# Patient Record
Sex: FEMALE | Race: WHITE | NOT HISPANIC OR LATINO | Employment: FULL TIME | URBAN - METROPOLITAN AREA
[De-identification: names, ages, dates, MRNs, and addresses within clinical notes are randomized per-mention and may not be internally consistent; named-entity substitution may affect disease eponyms.]

---

## 2021-03-20 ENCOUNTER — IMMUNIZATION (OUTPATIENT)
Dept: PRIMARY CARE CLINIC | Facility: CLINIC | Age: 26
End: 2021-03-20
Payer: COMMERCIAL

## 2021-03-20 DIAGNOSIS — Z23 NEED FOR VACCINATION: Primary | ICD-10-CM

## 2021-03-20 PROCEDURE — 0001A PR IMMUNIZ ADMIN, SARS-COV-2 COVID-19 VACC, 30MCG/0.3ML, 1ST DOSE: CPT | Mod: CV19,S$GLB,, | Performed by: INTERNAL MEDICINE

## 2021-03-20 PROCEDURE — 91300 PR SARS-COV- 2 COVID-19 VACCINE, NO PRSV, 30MCG/0.3ML, IM: CPT | Mod: S$GLB,,, | Performed by: INTERNAL MEDICINE

## 2021-03-20 PROCEDURE — 0001A PR IMMUNIZ ADMIN, SARS-COV-2 COVID-19 VACC, 30MCG/0.3ML, 1ST DOSE: ICD-10-PCS | Mod: CV19,S$GLB,, | Performed by: INTERNAL MEDICINE

## 2021-03-20 PROCEDURE — 91300 PR SARS-COV- 2 COVID-19 VACCINE, NO PRSV, 30MCG/0.3ML, IM: ICD-10-PCS | Mod: S$GLB,,, | Performed by: INTERNAL MEDICINE

## 2021-03-20 RX ADMIN — Medication 0.3 ML: at 09:03

## 2021-04-11 ENCOUNTER — IMMUNIZATION (OUTPATIENT)
Dept: PRIMARY CARE CLINIC | Facility: CLINIC | Age: 26
End: 2021-04-11
Payer: COMMERCIAL

## 2021-04-11 DIAGNOSIS — Z23 NEED FOR VACCINATION: Primary | ICD-10-CM

## 2021-04-11 PROCEDURE — 91300 PR SARS-COV- 2 COVID-19 VACCINE, NO PRSV, 30MCG/0.3ML, IM: ICD-10-PCS | Mod: S$GLB,,, | Performed by: INTERNAL MEDICINE

## 2021-04-11 PROCEDURE — 0002A PR IMMUNIZ ADMIN, SARS-COV-2 COVID-19 VACC, 30MCG/0.3ML, 2ND DOSE: CPT | Mod: CV19,S$GLB,, | Performed by: INTERNAL MEDICINE

## 2021-04-11 PROCEDURE — 0002A PR IMMUNIZ ADMIN, SARS-COV-2 COVID-19 VACC, 30MCG/0.3ML, 2ND DOSE: ICD-10-PCS | Mod: CV19,S$GLB,, | Performed by: INTERNAL MEDICINE

## 2021-04-11 PROCEDURE — 91300 PR SARS-COV- 2 COVID-19 VACCINE, NO PRSV, 30MCG/0.3ML, IM: CPT | Mod: S$GLB,,, | Performed by: INTERNAL MEDICINE

## 2021-04-11 RX ADMIN — Medication 0.3 ML: at 10:04

## 2024-03-07 ENCOUNTER — OFFICE VISIT (OUTPATIENT)
Dept: OBSTETRICS AND GYNECOLOGY | Facility: CLINIC | Age: 29
End: 2024-03-07
Payer: COMMERCIAL

## 2024-03-07 VITALS — WEIGHT: 149.69 LBS | HEIGHT: 63 IN | BODY MASS INDEX: 26.52 KG/M2

## 2024-03-07 DIAGNOSIS — Z01.419 ENCOUNTER FOR GYNECOLOGICAL EXAMINATION: Primary | ICD-10-CM

## 2024-03-07 DIAGNOSIS — Z12.4 SCREENING FOR CERVICAL CANCER: ICD-10-CM

## 2024-03-07 DIAGNOSIS — N92.6 IRREGULAR PERIODS: ICD-10-CM

## 2024-03-07 DIAGNOSIS — Z11.51 SCREENING FOR HPV (HUMAN PAPILLOMAVIRUS): ICD-10-CM

## 2024-03-07 PROCEDURE — 3008F BODY MASS INDEX DOCD: CPT | Mod: CPTII,S$GLB,, | Performed by: OBSTETRICS & GYNECOLOGY

## 2024-03-07 PROCEDURE — 99459 PELVIC EXAMINATION: CPT | Mod: S$GLB,,, | Performed by: OBSTETRICS & GYNECOLOGY

## 2024-03-07 PROCEDURE — 99999 PR PBB SHADOW E&M-EST. PATIENT-LVL III: CPT | Mod: PBBFAC,,, | Performed by: OBSTETRICS & GYNECOLOGY

## 2024-03-07 PROCEDURE — 1159F MED LIST DOCD IN RCRD: CPT | Mod: CPTII,S$GLB,, | Performed by: OBSTETRICS & GYNECOLOGY

## 2024-03-07 PROCEDURE — 88175 CYTOPATH C/V AUTO FLUID REDO: CPT | Performed by: OBSTETRICS & GYNECOLOGY

## 2024-03-07 PROCEDURE — 1160F RVW MEDS BY RX/DR IN RCRD: CPT | Mod: CPTII,S$GLB,, | Performed by: OBSTETRICS & GYNECOLOGY

## 2024-03-07 PROCEDURE — 99385 PREV VISIT NEW AGE 18-39: CPT | Mod: S$GLB,,, | Performed by: OBSTETRICS & GYNECOLOGY

## 2024-03-07 PROCEDURE — 87624 HPV HI-RISK TYP POOLED RSLT: CPT | Performed by: OBSTETRICS & GYNECOLOGY

## 2024-03-07 RX ORDER — INSULIN LISPRO 100 [IU]/ML
INJECTION, SOLUTION INTRAVENOUS; SUBCUTANEOUS
COMMUNITY

## 2024-03-07 RX ORDER — CITALOPRAM 20 MG/1
20 TABLET, FILM COATED ORAL
COMMUNITY

## 2024-03-07 NOTE — PROGRESS NOTES
"Subjective:       Patient ID: Maryjane Land is a 28 y.o. female.    Chief Complaint:  Annual Exam (Last pap: 8/2023 Normal; )      History of Present Illness  - here for annual. Recently  and trying for pregnancy for last couple of months.   - had irregular periods when she was younger. Took ocps on and off in college. Had cramps when periods were irregular.  - reports had a 52 day cycle recently. Periods had otherwise been regular from age 20 until surgery in 2022 when they became somewhat irregular again.  - gets "peak" ovulation monthly since last May, except for in 52 day cycle.   - has had bilateral breast biopsies for diabetic mastopathy.    Past Medical History:   Diagnosis Date    Diabetes mellitus     type 1, dx'd at age 4    H/O breast biopsy        Past Surgical History:   Procedure Laterality Date    MANDIBLE SURGERY Bilateral 08/26/2022    total joint replacements        History reviewed. No pertinent family history.     Social History     Socioeconomic History    Marital status:    Tobacco Use    Smoking status: Never    Smokeless tobacco: Never   Substance and Sexual Activity    Alcohol use: Yes    Drug use: Never    Sexual activity: Yes     Partners: Male           Objective:     Vitals:    03/07/24 0828   Weight: 67.9 kg (149 lb 11.1 oz)   Height: 5' 3" (1.6 m)       Physical Exam:   Constitutional: She is oriented to person, place, and time. She appears well-developed and well-nourished.        Pulmonary/Chest: Right breast exhibits no mass, no nipple discharge, no skin change, no tenderness and no swelling. Left breast exhibits no mass, no nipple discharge, no skin change, no tenderness and no swelling. Breasts are symmetrical.        Abdominal: Soft. She exhibits no distension. There is no abdominal tenderness.     Genitourinary:    Vagina and uterus normal.   There is no tenderness or lesion on the right labia. There is no tenderness or lesion on the left labia. Cervix is " normal. Right adnexum displays no mass, no tenderness and no fullness. Left adnexum displays no mass, no tenderness and no fullness. No vaginal discharge in the vagina.    pap smear completed          Musculoskeletal: Moves all extremeties.       Neurological: She is alert and oriented to person, place, and time.     Psychiatric: She has a normal mood and affect.        A female chaperone was present for exam.    Assessment/ Plan:     Orders Placed This Encounter    HPV High Risk Genotypes, PCR    TSH    Luteinizing Hormone    Follicle Stimulating Hormone    PROLACTIN    ANTIMULLERIAN HORMONE ([facility])    Liquid-Based Pap Smear, Screening       Maryjane was seen today for annual exam.    Diagnoses and all orders for this visit:    Encounter for gynecological examination    Screening for HPV (human papillomavirus)  -     HPV High Risk Genotypes, PCR    Screening for cervical cancer  -     Liquid-Based Pap Smear, Screening    Irregular periods  -     TSH; Future  -     Luteinizing Hormone; Future  -     Follicle Stimulating Hormone; Future  -     PROLACTIN; Future  -     ANTIMULLERIAN HORMONE (AMH); Future    - reassured that cycle is likely regulating.   - draw day 3 labs  - PNV daily    Follow up in about 1 year (around 3/7/2025) for annual exam.    As of April 1, 2021, the Cures Act has been passed nationally. This new law requires that all doctors progress notes, lab results, pathology reports and radiology reports be released IMMEDIATELY to the patient in the patient portal. That means that the results are released to you at the EXACT same time they are released to me. Therefore, with all of the patients that I have I am not able to reply to each patient exactly when the results come in. So there will be a delay from when you see the results to when I see them and have time to come up with a response to send you. Also I only see these results when I am on the computer at work. So if the results come in over the  weekend or after 5 pm of a work day, I will not see them until the next business day. As you can tell, this is a challenge as a physician to give every patient the quick response they hope for and deserve. So please be patient!   Thanks for your understanding and patience.

## 2024-03-14 LAB
FINAL PATHOLOGIC DIAGNOSIS: NORMAL
HPV HR 12 DNA SPEC QL NAA+PROBE: NEGATIVE
HPV16 AG SPEC QL: NEGATIVE
HPV18 DNA SPEC QL NAA+PROBE: NEGATIVE
Lab: NORMAL

## 2024-04-12 LAB
FSH SERPL-ACNC: 6.4 MIU/ML
LH SERPL-ACNC: 9.1 MIU/ML
MIS SERPL-MCNC: 5.58 NG/ML (ref 0.69–13.39)
PROLACTIN SERPL-MCNC: 20 NG/ML

## 2024-04-16 ENCOUNTER — PATIENT MESSAGE (OUTPATIENT)
Dept: OBSTETRICS AND GYNECOLOGY | Facility: CLINIC | Age: 29
End: 2024-04-16
Payer: COMMERCIAL

## 2024-05-14 ENCOUNTER — TELEPHONE (OUTPATIENT)
Dept: OBSTETRICS AND GYNECOLOGY | Facility: CLINIC | Age: 29
End: 2024-05-14
Payer: COMMERCIAL

## 2024-05-14 DIAGNOSIS — N92.6 MISSED MENSES: Primary | ICD-10-CM

## 2024-05-15 ENCOUNTER — TELEPHONE (OUTPATIENT)
Dept: OBSTETRICS AND GYNECOLOGY | Facility: CLINIC | Age: 29
End: 2024-05-15
Payer: COMMERCIAL

## 2024-05-15 ENCOUNTER — LAB VISIT (OUTPATIENT)
Dept: LAB | Facility: HOSPITAL | Age: 29
End: 2024-05-15
Attending: OBSTETRICS & GYNECOLOGY
Payer: COMMERCIAL

## 2024-05-15 DIAGNOSIS — N92.6 MISSED MENSES: ICD-10-CM

## 2024-05-15 DIAGNOSIS — Z34.90 PREGNANCY, UNSPECIFIED GESTATIONAL AGE: Primary | ICD-10-CM

## 2024-05-15 LAB
HCG INTACT+B SERPL-ACNC: 167 MIU/ML
PROGEST SERPL-MCNC: 8.7 NG/ML

## 2024-05-15 PROCEDURE — 84702 CHORIONIC GONADOTROPIN TEST: CPT | Performed by: OBSTETRICS & GYNECOLOGY

## 2024-05-15 PROCEDURE — 36415 COLL VENOUS BLD VENIPUNCTURE: CPT | Performed by: OBSTETRICS & GYNECOLOGY

## 2024-05-15 PROCEDURE — 84144 ASSAY OF PROGESTERONE: CPT | Performed by: OBSTETRICS & GYNECOLOGY

## 2024-05-16 DIAGNOSIS — N92.6 MISSED MENSES: Primary | ICD-10-CM

## 2024-05-16 RX ORDER — PROGESTERONE 200 MG/1
200 CAPSULE ORAL 2 TIMES DAILY
Qty: 30 CAPSULE | Refills: 0 | Status: SHIPPED | OUTPATIENT
Start: 2024-05-16 | End: 2024-05-20 | Stop reason: SDUPTHER

## 2024-05-16 NOTE — TELEPHONE ENCOUNTER
----- Message from Marsha Sampson MD sent at 5/16/2024 10:31 AM CDT -----  Spoke with patient. Start progesterone; I sent Rx in. Needs repeat hCG 48 hours from last; I put order in. Please schedule.  
Gave apt to pt for labs   
<-- Click to add NO pertinent Family History

## 2024-05-17 ENCOUNTER — LAB VISIT (OUTPATIENT)
Dept: LAB | Facility: HOSPITAL | Age: 29
End: 2024-05-17
Attending: OBSTETRICS & GYNECOLOGY
Payer: COMMERCIAL

## 2024-05-17 DIAGNOSIS — N92.6 MISSED MENSES: ICD-10-CM

## 2024-05-17 LAB — HCG INTACT+B SERPL-ACNC: 565 MIU/ML

## 2024-05-17 PROCEDURE — 84702 CHORIONIC GONADOTROPIN TEST: CPT | Performed by: OBSTETRICS & GYNECOLOGY

## 2024-05-17 PROCEDURE — 36415 COLL VENOUS BLD VENIPUNCTURE: CPT | Performed by: OBSTETRICS & GYNECOLOGY

## 2024-05-20 ENCOUNTER — OFFICE VISIT (OUTPATIENT)
Dept: OBSTETRICS AND GYNECOLOGY | Facility: CLINIC | Age: 29
End: 2024-05-20
Payer: COMMERCIAL

## 2024-05-20 VITALS
SYSTOLIC BLOOD PRESSURE: 120 MMHG | WEIGHT: 153 LBS | BODY MASS INDEX: 27.11 KG/M2 | HEIGHT: 63 IN | DIASTOLIC BLOOD PRESSURE: 70 MMHG

## 2024-05-20 DIAGNOSIS — Z34.90 PREGNANCY, UNSPECIFIED GESTATIONAL AGE: ICD-10-CM

## 2024-05-20 DIAGNOSIS — N91.2 AMENORRHEA: Primary | ICD-10-CM

## 2024-05-20 PROCEDURE — 3078F DIAST BP <80 MM HG: CPT | Mod: CPTII,S$GLB,, | Performed by: OBSTETRICS & GYNECOLOGY

## 2024-05-20 PROCEDURE — 3074F SYST BP LT 130 MM HG: CPT | Mod: CPTII,S$GLB,, | Performed by: OBSTETRICS & GYNECOLOGY

## 2024-05-20 PROCEDURE — 1159F MED LIST DOCD IN RCRD: CPT | Mod: CPTII,S$GLB,, | Performed by: OBSTETRICS & GYNECOLOGY

## 2024-05-20 PROCEDURE — 99999 PR PBB SHADOW E&M-EST. PATIENT-LVL III: CPT | Mod: PBBFAC,,, | Performed by: OBSTETRICS & GYNECOLOGY

## 2024-05-20 PROCEDURE — 99213 OFFICE O/P EST LOW 20 MIN: CPT | Mod: S$GLB,,, | Performed by: OBSTETRICS & GYNECOLOGY

## 2024-05-20 PROCEDURE — 3008F BODY MASS INDEX DOCD: CPT | Mod: CPTII,S$GLB,, | Performed by: OBSTETRICS & GYNECOLOGY

## 2024-05-20 RX ORDER — LEVOTHYROXINE SODIUM 25 UG/1
25 TABLET ORAL EVERY MORNING
COMMUNITY
Start: 2024-05-10

## 2024-05-20 RX ORDER — PROGESTERONE 200 MG/1
200 CAPSULE ORAL 2 TIMES DAILY
Qty: 60 CAPSULE | Refills: 2 | Status: SHIPPED | OUTPATIENT
Start: 2024-05-20 | End: 2024-05-29 | Stop reason: SDUPTHER

## 2024-05-20 RX ORDER — INSULIN ASPART 100 [IU]/ML
INJECTION, SOLUTION INTRAVENOUS; SUBCUTANEOUS
COMMUNITY
Start: 2024-04-02

## 2024-05-20 NOTE — PROGRESS NOTES
Subjective:       Patient ID: Maryjane Land is a 28 y.o. female.    Chief Complaint:  Possible Pregnancy (Pregnancy Confirmation:/Start of LMP: 4/7./She does have Type 1 DM. )      History of Present Illness  - patient presents with positive UPT. Taking progesterone BID for low level. bhCG nia appropriately. No issues.  - last hemoglobin A1c was 6.4.     Past Medical History:   Diagnosis Date    Diabetes mellitus     type 1, dx'd at age 4    H/O breast biopsy        Past Surgical History:   Procedure Laterality Date    MANDIBLE SURGERY Bilateral 08/26/2022    total joint replacements        No family history on file.     Social History     Socioeconomic History    Marital status:    Tobacco Use    Smoking status: Never    Smokeless tobacco: Never   Substance and Sexual Activity    Alcohol use: Yes    Drug use: Never    Sexual activity: Yes     Partners: Male     Social Determinants of Health     Financial Resource Strain: Low Risk  (1/31/2023)    Received from [facility]    Overall Financial Resource Strain (CARDIA)     Difficulty of Paying Living Expenses: Not very hard   Food Insecurity: No Food Insecurity (1/31/2023)    Received from [facility]    Hunger Vital Sign     Worried About Running Out of Food in the Last Year: Never true     Ran Out of Food in the Last Year: Never true   Transportation Needs: No Transportation Needs (1/31/2023)    Received from [facility]    PRAPARE - Transportation     Lack of Transportation (Medical): No     Lack of Transportation (Non-Medical): No   Stress: Stress Concern Present (1/31/2023)    Received from [facility]    [employer] - Occupational Stress Questionnaire     Feeling of Stress : To some extent   Housing Stability: Low Risk  (1/31/2023)    Received from [facility]    Housing Stability Vital Sign     Unable to Pay for Housing in the Last Year: No     Number  "of Places Lived in the Last Year: 1     In the last 12 months, was there a time when you did not have a steady place to sleep or slept in a shelter (including now)?: No           Objective:     Vitals:    05/20/24 1409   BP: 120/70   BP Location: Left arm   Patient Position: Sitting   BP Method: Medium (Manual)   Weight: 69.4 kg (153 lb)   Height: 5' 3" (1.6 m)       Physical Exam:   Constitutional: She appears well-developed and well-nourished. She is cooperative. No distress.                           Neurological: She is alert.          Assessment/ Plan:     Orders Placed This Encounter    Urine culture    C. trachomatis/N. gonorrhoeae by [facility]; Urine    POCT urine pregnancy       Maryjane was seen today for possible pregnancy.    Diagnoses and all orders for this visit:    Amenorrhea  -     POCT urine pregnancy    Pregnancy, unspecified gestational age  -     Urine culture  -     C. trachomatis/N. gonorrhoeae by [facility]; Urine    - gave prenatal handout and answered questions.  - continue PNV with DHA daily.  - gave spotting/cramping precautions.  - discussed [id number]; gave pamphlets and answered questions.  - discussed Connected MOM.  - discussed travel and Zika precautions. Check CDC.gov for updates.  - discussed CDC guidelines re: COVID-19. Encouraged patient to be vaccinated per ACOG and CDC recommendations.    - seeing Endo monthly. Will have weekly blood sugar review.     Follow up in 3 weeks (on 6/10/2024) for prenatal visit, ultrasound.    As of April 1, 2021, the Cures Act has been passed nationally. This new law requires that all doctors progress notes, lab results, pathology reports and radiology reports be released IMMEDIATELY to the patient in the patient portal. That means that the results are released to you at the EXACT same time they are released to me. Therefore, with all of the patients that I have I am not able to reply to each patient exactly when the results come in. So " there will be a delay from when you see the results to when I see them and have time to come up with a response to send you. Also I only see these results when I am on the computer at work. So if the results come in over the weekend or after 5 pm of a work day, I will not see them until the next business day. As you can tell, this is a challenge as a physician to give every patient the quick response they hope for and deserve. So please be patient!   Thanks for your understanding and patience.

## 2024-05-22 ENCOUNTER — PATIENT MESSAGE (OUTPATIENT)
Dept: OBSTETRICS AND GYNECOLOGY | Facility: CLINIC | Age: 29
End: 2024-05-22
Payer: COMMERCIAL

## 2024-05-30 RX ORDER — PROGESTERONE 200 MG/1
200 CAPSULE ORAL 2 TIMES DAILY
Qty: 60 CAPSULE | Refills: 2 | Status: SHIPPED | OUTPATIENT
Start: 2024-05-30

## 2024-05-30 NOTE — TELEPHONE ENCOUNTER
Refill Routing Note   Medication(s) are not appropriate for processing by [facility] for the following reason(s):        New or recently adjusted medication  Dose override needed on the order    [facility] action(s):  Defer        Medication Therapy Plan: Previouis order has not been filled yet at pharmacy. Informed patient to check with [facility].      Appointments  past 12m or future 3m with PCP    Date Provider   Last Visit   5/20/2024 Marsha Sampson MD   Next Visit   6/10/2024 Marsha Sampson MD   ED visits in past 90 days: 0        Note composed:7:52 AM 05/30/2024

## 2024-06-10 ENCOUNTER — INITIAL PRENATAL (OUTPATIENT)
Dept: OBSTETRICS AND GYNECOLOGY | Facility: CLINIC | Age: 29
End: 2024-06-10
Payer: COMMERCIAL

## 2024-06-10 ENCOUNTER — PROCEDURE VISIT (OUTPATIENT)
Dept: OBSTETRICS AND GYNECOLOGY | Facility: CLINIC | Age: 29
End: 2024-06-10
Payer: COMMERCIAL

## 2024-06-10 VITALS — DIASTOLIC BLOOD PRESSURE: 72 MMHG | SYSTOLIC BLOOD PRESSURE: 122 MMHG

## 2024-06-10 DIAGNOSIS — Z34.90 PREGNANCY, UNSPECIFIED GESTATIONAL AGE: ICD-10-CM

## 2024-06-10 DIAGNOSIS — O26.841 UTERINE SIZE-DATE DISCREPANCY IN FIRST TRIMESTER: Primary | ICD-10-CM

## 2024-06-10 PROCEDURE — 99999 PR PBB SHADOW E&M-EST. PATIENT-LVL II: CPT | Mod: PBBFAC,,, | Performed by: OBSTETRICS & GYNECOLOGY

## 2024-06-10 PROCEDURE — 0502F SUBSEQUENT PRENATAL CARE: CPT | Mod: CPTII,S$GLB,, | Performed by: OBSTETRICS & GYNECOLOGY

## 2024-06-10 PROCEDURE — 76801 OB US < 14 WKS SINGLE FETUS: CPT | Mod: S$GLB,,, | Performed by: OBSTETRICS & GYNECOLOGY

## 2024-06-10 NOTE — PROGRESS NOTES
Patient presents for initial OB and u/s. No complaints. U/S shows size less than dates; unable to say non-viable due to size of gestational sac. Patient ovulated one week later than LMP would indicate. Will repeat u/s in one week for confirmation. Patient and spouse are aware that u/s likely will not have changed in one week. Can continue progesterone and prenatal vitamin. Will call with any issues.

## 2024-06-17 ENCOUNTER — ROUTINE PRENATAL (OUTPATIENT)
Dept: OBSTETRICS AND GYNECOLOGY | Facility: CLINIC | Age: 29
End: 2024-06-17
Payer: COMMERCIAL

## 2024-06-17 ENCOUNTER — LAB VISIT (OUTPATIENT)
Dept: LAB | Facility: HOSPITAL | Age: 29
End: 2024-06-17
Attending: OBSTETRICS & GYNECOLOGY
Payer: COMMERCIAL

## 2024-06-17 ENCOUNTER — PROCEDURE VISIT (OUTPATIENT)
Dept: OBSTETRICS AND GYNECOLOGY | Facility: CLINIC | Age: 29
End: 2024-06-17
Payer: COMMERCIAL

## 2024-06-17 VITALS — SYSTOLIC BLOOD PRESSURE: 118 MMHG | DIASTOLIC BLOOD PRESSURE: 78 MMHG

## 2024-06-17 DIAGNOSIS — O02.1 MISSED ABORTION: ICD-10-CM

## 2024-06-17 DIAGNOSIS — O26.841 UTERINE SIZE-DATE DISCREPANCY IN FIRST TRIMESTER: ICD-10-CM

## 2024-06-17 DIAGNOSIS — O02.1 MISSED ABORTION: Primary | ICD-10-CM

## 2024-06-17 LAB
BASOPHILS # BLD AUTO: 0.07 K/UL (ref 0–0.2)
BASOPHILS NFR BLD: 0.6 % (ref 0–1.9)
DIFFERENTIAL METHOD BLD: ABNORMAL
EOSINOPHIL # BLD AUTO: 0.1 K/UL (ref 0–0.5)
EOSINOPHIL NFR BLD: 0.7 % (ref 0–8)
ERYTHROCYTE [DISTWIDTH] IN BLOOD BY AUTOMATED COUNT: 12.7 % (ref 11.5–14.5)
HCG INTACT+B SERPL-ACNC: NORMAL MIU/ML
HCT VFR BLD AUTO: 38.1 % (ref 37–48.5)
HGB BLD-MCNC: 12.7 G/DL (ref 12–16)
IMM GRANULOCYTES # BLD AUTO: 0.04 K/UL (ref 0–0.04)
IMM GRANULOCYTES NFR BLD AUTO: 0.4 % (ref 0–0.5)
LYMPHOCYTES # BLD AUTO: 2 K/UL (ref 1–4.8)
LYMPHOCYTES NFR BLD: 17.6 % (ref 18–48)
MCH RBC QN AUTO: 29 PG (ref 27–31)
MCHC RBC AUTO-ENTMCNC: 33.3 G/DL (ref 32–36)
MCV RBC AUTO: 87 FL (ref 82–98)
MONOCYTES # BLD AUTO: 1.2 K/UL (ref 0.3–1)
MONOCYTES NFR BLD: 10.4 % (ref 4–15)
NEUTROPHILS # BLD AUTO: 7.8 K/UL (ref 1.8–7.7)
NEUTROPHILS NFR BLD: 70.3 % (ref 38–73)
NRBC BLD-RTO: 0 /100 WBC
PLATELET # BLD AUTO: 420 K/UL (ref 150–450)
PMV BLD AUTO: 10.8 FL (ref 9.2–12.9)
RBC # BLD AUTO: 4.38 M/UL (ref 4–5.4)
WBC # BLD AUTO: 11.1 K/UL (ref 3.9–12.7)

## 2024-06-17 PROCEDURE — 86850 RBC ANTIBODY SCREEN: CPT | Performed by: OBSTETRICS & GYNECOLOGY

## 2024-06-17 PROCEDURE — 99213 OFFICE O/P EST LOW 20 MIN: CPT | Mod: 57,S$GLB,, | Performed by: OBSTETRICS & GYNECOLOGY

## 2024-06-17 PROCEDURE — 99999 PR PBB SHADOW E&M-EST. PATIENT-LVL III: CPT | Mod: PBBFAC,,, | Performed by: OBSTETRICS & GYNECOLOGY

## 2024-06-17 PROCEDURE — 36415 COLL VENOUS BLD VENIPUNCTURE: CPT | Performed by: OBSTETRICS & GYNECOLOGY

## 2024-06-17 PROCEDURE — 85025 COMPLETE CBC W/AUTO DIFF WBC: CPT | Performed by: OBSTETRICS & GYNECOLOGY

## 2024-06-17 PROCEDURE — 76817 TRANSVAGINAL US OBSTETRIC: CPT | Mod: S$GLB,,, | Performed by: OBSTETRICS & GYNECOLOGY

## 2024-06-17 PROCEDURE — 84702 CHORIONIC GONADOTROPIN TEST: CPT | Performed by: OBSTETRICS & GYNECOLOGY

## 2024-06-17 PROCEDURE — 86900 BLOOD TYPING SEROLOGIC ABO: CPT | Performed by: OBSTETRICS & GYNECOLOGY

## 2024-06-17 NOTE — PROGRESS NOTES
Ultrasound confirms non-viable IUP. Discussed options for management: expectant vs medical vs surgical. Patient opts for suction D&C. Discussed risks/benefits. Consents signed. Labs ordered. Will continue progesterone for now because has requested procedure next week. Case request entered.

## 2024-06-18 ENCOUNTER — TELEPHONE (OUTPATIENT)
Dept: OBSTETRICS AND GYNECOLOGY | Facility: CLINIC | Age: 29
End: 2024-06-18
Payer: COMMERCIAL

## 2024-06-18 LAB
ABO + RH BLD: NORMAL
BLD GP AB SCN CELLS X3 SERPL QL: NORMAL

## 2024-06-18 NOTE — TELEPHONE ENCOUNTER
----- Message from Marsha Sampson MD sent at 6/17/2024  2:28 PM CDT -----    Requested Date: 6/25   Requested Time: 1000   Case Length:30 minutes    Surgeon: Marsha Sampson      Assistant Surgeon: None   Visit Type: Outpatient    LOC[facility]: [facility]    PROCEDURE: suction D&C  Diagnosis: missed ab  Anesthesia type: General   Comments/Special Equipment Needed: suction  Rep needed: No  Does the patient need a PreOp appointment with MD: No  U/S needed at pre-op: No  PCP clearance: Not Needed  When does she need her Post op appointment: 2 weeks virtual   Do you need additional time blocked out from clinic? No  If so, what time do you want to be back in clinic? N/a  When can the patient go back to work? N/a    I signed consents today in clinic.

## 2024-06-19 NOTE — H&P
[facility] ([facility])  Obstetrics & Gynecology  History & Physical    Patient Name: Maryjane Land  MRN: [id number]  Admission Date: (Not on file)  Primary Care Provider: Gaby, Primary Doctor    Subjective:     Chief Complaint/Reason for Admission: missed     History of Present Illness: Patient presented to office for routine pregnancy confirmation. Denies bleeding/cramping. Ultrasounds done a week apart confirm non-viable pregnancy. She desires definitive treatment with suction D&C.    No current facility-administered medications on file prior to encounter.     Current Outpatient Medications on File Prior to Encounter   Medication Sig    citalopram (CELEXA) 20 MG tablet Take 20 mg by mouth.    insulin lispro (HUMALOG U-100 INSULIN) 100 unit/mL injection as directed Injection    NOVOLOG U-100 INSULIN ASPART 100 unit/mL injection Inject into the skin.    prenatal vit/iron fum/folic ac (PRENATAL 1+1 ORAL) Take by mouth once daily.    progesterone (PROMETRIUM) 200 MG capsule Take 1 capsule (200 mg total) by mouth 2 (two) times a day.    SYNTHROID 25 mcg tablet Take 25 mcg by mouth every morning.       Review of patient's allergies indicates:   Allergen Reactions    Gum mastic Blisters, Dermatitis, Hives, Itching, Rash and Swelling    Gum ughksy-texztl-oike-alcohol Hives and Other (See Comments)       Past Medical History:   Diagnosis Date    Diabetes mellitus     type 1, dx'd at age 4    H/O breast biopsy     Hypothyroidism, unspecified      OB History    Para Term  AB Living   1 0 0 0 0 0   SAB IAB Ectopic Multiple Live Births   0 0 0 0 0      # Outcome Date GA Lbr Sarbjit/2nd Weight Sex Type Anes PTL Lv   1 Current              Past Surgical History:   Procedure Laterality Date    MANDIBLE SURGERY Bilateral 2022    total joint replacements     Family History    None       Tobacco Use    Smoking status: Never    Smokeless tobacco: Never   Substance and Sexual Activity     Alcohol use: Yes    Drug use: Never    Sexual activity: Yes     Partners: Male     Review of Systems  Review of Systems - General ROS: negative  Psychological ROS: negative  Respiratory ROS: no cough, shortness of breath, or wheezing  Cardiovascular ROS: no chest pain or dyspnea on exertion  Gastrointestinal ROS: no abdominal pain, change in bowel habits, or black/bloody stools  Genito-Urinary ROS: no dysuria, trouble voiding, or hematuria  Neurological ROS: no TIA or stroke symptoms  Skin ROS: warm, dry, no rashes  Musculoskeletal ROS: no deformities  Allergy ROS: no sneezing or coughing  Breasts ROS: no masses or nipple discharge      Objective:     Vital Signs (Most Recent):    Vital Signs (24h Range):           There is no height or weight on file to calculate BMI.  Patient's last menstrual period was 2024 (exact date).    Physical Exam  Gen: AAO x 3, NAD  VS: see admit vitals  Heart: RRR  Lungs: CTA bilaterally  Abdomen: soft, NT/ND  Pelvic: deferred  Ext: no CCE    Laboratory:  CBC:   Recent Labs   Lab 24  1411   WBC 11.10   RBC 4.38   HGB 12.7   HCT 38.1      MCV 87   MCH 29.0   MCHC 33.3     Type & Screen: A pos, Antibody negative    Diagnostic Results:  US: Reviewed       Assessment/Plan:     Missed     - procedure reviewed in detail with patient and spouse. Consents signed.  - suction D&C on     Marsha Sampson MD  Obstetrics & Gynecology  [facility] ([facility])

## 2024-06-24 NOTE — PRE-PROCEDURE INSTRUCTIONS
Unable to reach patient via phone. Left message with pre procedure instructions and arrival time. The following was sent to pt portal.    Dear Maryjane ,     You are scheduled for a procedure with Dr. Sampson on 6/25/2024. Your scheduled arrival time is 8:00 am.  This arrival time is roughly 2 hours before your anticipated procedure time to allow sufficient time for pre-op.  Please wear comfortable clothing  This procedure will take place at the [facility] at the corner of [facility].  [facility] [place] [facility] to [facility]. The address is:     [address] [place] [address].  [place] [id number]     After entering the building, proceed to the second floor and check in with registration. You should take any medications that you routinely take for blood pressure (other than those listed below), heart medications, thyroid, cholesterol, etc.      If you wear contact lenses, please wear glasses to your procedure.     Your fasting instructions are as follow:     Nothing to eat after midnight the evening before your surgery.   You may drink clear liquids up until 2 hours prior to your arrival time.      You MUST have a responsible adult to bring you home.     The evening before and morning of your procedure, please hold the following medications:  Aspirin and Aspirin-containing products (Goody's powder, Excedrin)  NSAIDs (Advil, Ibuprofen, Aleve, Diclofenac)  Vitamins/Supplements  Herbal remedies/Teas  Stimulants (Adderall, Vyvanse, Adipex)  Diabetic medication (Please bring with you day of procedure)  Prescription creams/gels/lotions        *May take Tylenol        The evening before and morning of your procedure, take a shower using antibacterial soap (ex: Hibiclens or Dial antibacterial soap). DO NOT apply deodorant, lotion, cologne, or anything else to the skin. Do not wear jewelry or bring any valuables with you. If you wear dentures please bring your case with you  or leave them at home. Use and bring any inhalers that you may have.     If you have any procedure-specific questions, please call your surgeon's office. Any other questions, don't hesitate to call at [phone]     Thanks,  Pre-Admit Testing  Anesthesia Dept [facility]

## 2024-06-25 ENCOUNTER — ANESTHESIA EVENT (OUTPATIENT)
Dept: SURGERY | Facility: HOSPITAL | Age: 29
End: 2024-06-25
Payer: COMMERCIAL

## 2024-06-25 ENCOUNTER — ANESTHESIA (OUTPATIENT)
Dept: SURGERY | Facility: HOSPITAL | Age: 29
End: 2024-06-25
Payer: COMMERCIAL

## 2024-06-25 ENCOUNTER — HOSPITAL ENCOUNTER (OUTPATIENT)
Facility: HOSPITAL | Age: 29
Discharge: HOME | End: 2024-06-25
Attending: OBSTETRICS & GYNECOLOGY | Admitting: OBSTETRICS & GYNECOLOGY
Payer: COMMERCIAL

## 2024-06-25 VITALS
TEMPERATURE: 98 F | HEART RATE: 79 BPM | SYSTOLIC BLOOD PRESSURE: 107 MMHG | OXYGEN SATURATION: 100 % | RESPIRATION RATE: 16 BRPM | DIASTOLIC BLOOD PRESSURE: 53 MMHG

## 2024-06-25 DIAGNOSIS — O02.1 MISSED ABORTION: Primary | ICD-10-CM

## 2024-06-25 LAB — POCT GLUCOSE: 118 MG/DL (ref 70–110)

## 2024-06-25 PROCEDURE — 71000015 HC POSTOP RECOV 1ST HR: Performed by: OBSTETRICS & GYNECOLOGY

## 2024-06-25 PROCEDURE — 37000009 HC ANESTHESIA EA ADD 15 MINS: Performed by: OBSTETRICS & GYNECOLOGY

## 2024-06-25 PROCEDURE — 94761 N-INVAS EAR/PLS OXIMETRY MLT: CPT

## 2024-06-25 PROCEDURE — 37000008 HC ANESTHESIA 1ST 15 MINUTES: Performed by: OBSTETRICS & GYNECOLOGY

## 2024-06-25 PROCEDURE — 88305 TISSUE EXAM BY PATHOLOGIST: CPT | Performed by: PATHOLOGY

## 2024-06-25 PROCEDURE — 71000033 HC RECOVERY, INTIAL HOUR: Performed by: OBSTETRICS & GYNECOLOGY

## 2024-06-25 PROCEDURE — 99900035 HC TECH TIME PER 15 MIN (STAT)

## 2024-06-25 PROCEDURE — 36000705 HC OR TIME LEV I EA ADD 15 MIN: Performed by: OBSTETRICS & GYNECOLOGY

## 2024-06-25 PROCEDURE — 25000003 PHARM REV CODE 250: Performed by: OBSTETRICS & GYNECOLOGY

## 2024-06-25 PROCEDURE — 63600175 PHARM REV CODE 636 W HCPCS: Performed by: ANESTHESIOLOGY

## 2024-06-25 PROCEDURE — 88305 TISSUE EXAM BY PATHOLOGIST: CPT | Mod: 26,,, | Performed by: PATHOLOGY

## 2024-06-25 PROCEDURE — 59820 CARE OF MISCARRIAGE: CPT | Mod: ,,, | Performed by: OBSTETRICS & GYNECOLOGY

## 2024-06-25 PROCEDURE — 25000003 PHARM REV CODE 250: Performed by: ANESTHESIOLOGY

## 2024-06-25 PROCEDURE — 25000003 PHARM REV CODE 250: Performed by: NURSE ANESTHETIST, CERTIFIED REGISTERED

## 2024-06-25 PROCEDURE — 36000704 HC OR TIME LEV I 1ST 15 MIN: Performed by: OBSTETRICS & GYNECOLOGY

## 2024-06-25 RX ORDER — PROPOFOL 10 MG/ML
VIAL (ML) INTRAVENOUS
Status: DISCONTINUED | OUTPATIENT
Start: 2024-06-25 | End: 2024-06-25

## 2024-06-25 RX ORDER — IBUPROFEN 200 MG
600 TABLET ORAL EVERY 6 HOURS PRN
Status: DISCONTINUED | OUTPATIENT
Start: 2024-06-25 | End: 2024-06-25 | Stop reason: HOSPADM

## 2024-06-25 RX ORDER — ONDANSETRON HYDROCHLORIDE 2 MG/ML
4 INJECTION, SOLUTION INTRAVENOUS ONCE
Status: COMPLETED | OUTPATIENT
Start: 2024-06-25 | End: 2024-06-25

## 2024-06-25 RX ORDER — ONDANSETRON HYDROCHLORIDE 2 MG/ML
4 INJECTION, SOLUTION INTRAVENOUS DAILY PRN
Status: DISCONTINUED | OUTPATIENT
Start: 2024-06-25 | End: 2024-06-25 | Stop reason: HOSPADM

## 2024-06-25 RX ORDER — DEXAMETHASONE SODIUM PHOSPHATE 4 MG/ML
INJECTION, SOLUTION INTRA-ARTICULAR; INTRALESIONAL; INTRAMUSCULAR; INTRAVENOUS; SOFT TISSUE
Status: DISCONTINUED | OUTPATIENT
Start: 2024-06-25 | End: 2024-06-25

## 2024-06-25 RX ORDER — HYDROMORPHONE HYDROCHLORIDE 1 MG/ML
0.2 INJECTION, SOLUTION INTRAMUSCULAR; INTRAVENOUS; SUBCUTANEOUS EVERY 5 MIN PRN
Status: DISCONTINUED | OUTPATIENT
Start: 2024-06-25 | End: 2024-06-25 | Stop reason: HOSPADM

## 2024-06-25 RX ORDER — LIDOCAINE HYDROCHLORIDE 20 MG/ML
INJECTION INTRAVENOUS
Status: DISCONTINUED | OUTPATIENT
Start: 2024-06-25 | End: 2024-06-25

## 2024-06-25 RX ORDER — HYDROCODONE BITARTRATE AND ACETAMINOPHEN 10; 325 MG/1; MG/1
1 TABLET ORAL EVERY 4 HOURS PRN
Status: DISCONTINUED | OUTPATIENT
Start: 2024-06-25 | End: 2024-06-25 | Stop reason: HOSPADM

## 2024-06-25 RX ORDER — ONDANSETRON 8 MG/1
8 TABLET, ORALLY DISINTEGRATING ORAL EVERY 8 HOURS PRN
Status: DISCONTINUED | OUTPATIENT
Start: 2024-06-25 | End: 2024-06-25

## 2024-06-25 RX ORDER — IBUPROFEN 600 MG/1
600 TABLET ORAL 3 TIMES DAILY
Qty: 30 TABLET | Refills: 0 | Status: SHIPPED | OUTPATIENT
Start: 2024-06-25

## 2024-06-25 RX ORDER — HYDROCODONE BITARTRATE AND ACETAMINOPHEN 5; 325 MG/1; MG/1
1 TABLET ORAL EVERY 4 HOURS PRN
Status: DISCONTINUED | OUTPATIENT
Start: 2024-06-25 | End: 2024-06-25 | Stop reason: HOSPADM

## 2024-06-25 RX ORDER — FENTANYL CITRATE 50 UG/ML
INJECTION, SOLUTION INTRAMUSCULAR; INTRAVENOUS
Status: DISCONTINUED | OUTPATIENT
Start: 2024-06-25 | End: 2024-06-25

## 2024-06-25 RX ORDER — ONDANSETRON HYDROCHLORIDE 2 MG/ML
INJECTION, SOLUTION INTRAVENOUS
Status: DISCONTINUED | OUTPATIENT
Start: 2024-06-25 | End: 2024-06-25

## 2024-06-25 RX ORDER — FENTANYL CITRATE 50 UG/ML
25 INJECTION, SOLUTION INTRAMUSCULAR; INTRAVENOUS EVERY 5 MIN PRN
Status: DISCONTINUED | OUTPATIENT
Start: 2024-06-25 | End: 2024-06-25 | Stop reason: HOSPADM

## 2024-06-25 RX ORDER — DIPHENHYDRAMINE HCL 25 MG
25 CAPSULE ORAL EVERY 4 HOURS PRN
Status: DISCONTINUED | OUTPATIENT
Start: 2024-06-25 | End: 2024-06-25 | Stop reason: HOSPADM

## 2024-06-25 RX ORDER — SILVER NITRATE 38.21; 12.74 MG/1; MG/1
STICK TOPICAL
Status: DISCONTINUED | OUTPATIENT
Start: 2024-06-25 | End: 2024-06-25 | Stop reason: HOSPADM

## 2024-06-25 RX ORDER — ROCURONIUM BROMIDE 10 MG/ML
INJECTION, SOLUTION INTRAVENOUS
Status: DISCONTINUED | OUTPATIENT
Start: 2024-06-25 | End: 2024-06-25

## 2024-06-25 RX ORDER — FAMOTIDINE 20 MG/1
20 TABLET, FILM COATED ORAL
Status: COMPLETED | OUTPATIENT
Start: 2024-06-25 | End: 2024-06-25

## 2024-06-25 RX ORDER — DOXYCYCLINE HYCLATE 100 MG
200 TABLET ORAL
Status: DISCONTINUED | OUTPATIENT
Start: 2024-06-25 | End: 2024-06-25 | Stop reason: HOSPADM

## 2024-06-25 RX ORDER — OXYCODONE AND ACETAMINOPHEN 5; 325 MG/1; MG/1
1 TABLET ORAL
Status: DISCONTINUED | OUTPATIENT
Start: 2024-06-25 | End: 2024-06-25 | Stop reason: HOSPADM

## 2024-06-25 RX ORDER — KETOROLAC TROMETHAMINE 30 MG/ML
INJECTION, SOLUTION INTRAMUSCULAR; INTRAVENOUS
Status: DISCONTINUED | OUTPATIENT
Start: 2024-06-25 | End: 2024-06-25

## 2024-06-25 RX ORDER — MIDAZOLAM HYDROCHLORIDE 1 MG/ML
INJECTION INTRAMUSCULAR; INTRAVENOUS
Status: DISCONTINUED | OUTPATIENT
Start: 2024-06-25 | End: 2024-06-25

## 2024-06-25 RX ORDER — OXYCODONE AND ACETAMINOPHEN 5; 325 MG/1; MG/1
1 TABLET ORAL EVERY 4 HOURS PRN
Qty: 10 TABLET | Refills: 0 | Status: SHIPPED | OUTPATIENT
Start: 2024-06-25

## 2024-06-25 RX ORDER — DIPHENHYDRAMINE HYDROCHLORIDE 50 MG/ML
25 INJECTION INTRAMUSCULAR; INTRAVENOUS EVERY 4 HOURS PRN
Status: DISCONTINUED | OUTPATIENT
Start: 2024-06-25 | End: 2024-06-25 | Stop reason: HOSPADM

## 2024-06-25 RX ORDER — PROCHLORPERAZINE EDISYLATE 5 MG/ML
5 INJECTION INTRAMUSCULAR; INTRAVENOUS EVERY 30 MIN PRN
Status: DISCONTINUED | OUTPATIENT
Start: 2024-06-25 | End: 2024-06-25 | Stop reason: HOSPADM

## 2024-06-25 RX ORDER — SODIUM CHLORIDE 9 MG/ML
INJECTION, SOLUTION INTRAVENOUS CONTINUOUS
Status: DISCONTINUED | OUTPATIENT
Start: 2024-06-25 | End: 2024-06-25 | Stop reason: HOSPADM

## 2024-06-25 RX ADMIN — PROPOFOL 150 MG: 10 INJECTION, EMULSION INTRAVENOUS at 10:06

## 2024-06-25 RX ADMIN — KETOROLAC TROMETHAMINE 30 MG: 30 INJECTION, SOLUTION INTRAMUSCULAR; INTRAVENOUS at 11:06

## 2024-06-25 RX ADMIN — LIDOCAINE HYDROCHLORIDE 100 MG: 20 INJECTION INTRAVENOUS at 10:06

## 2024-06-25 RX ADMIN — MIDAZOLAM HYDROCHLORIDE 2 MG: 1 INJECTION, SOLUTION INTRAMUSCULAR; INTRAVENOUS at 10:06

## 2024-06-25 RX ADMIN — ONDANSETRON 4 MG: 2 INJECTION INTRAMUSCULAR; INTRAVENOUS at 10:06

## 2024-06-25 RX ADMIN — DEXAMETHASONE SODIUM PHOSPHATE 4 MG: 4 INJECTION INTRA-ARTICULAR; INTRALESIONAL; INTRAMUSCULAR; INTRAVENOUS; SOFT TISSUE at 11:06

## 2024-06-25 RX ADMIN — ONDANSETRON 4 MG: 2 INJECTION INTRAMUSCULAR; INTRAVENOUS at 11:06

## 2024-06-25 RX ADMIN — DOXYCYCLINE HYCLATE 200 MG: 100 TABLET, COATED ORAL at 08:06

## 2024-06-25 RX ADMIN — FAMOTIDINE 20 MG: 20 TABLET ORAL at 08:06

## 2024-06-25 RX ADMIN — SODIUM CHLORIDE: 0.9 INJECTION, SOLUTION INTRAVENOUS at 08:06

## 2024-06-25 RX ADMIN — OXYCODONE HYDROCHLORIDE AND ACETAMINOPHEN 1 TABLET: 5; 325 TABLET ORAL at 12:06

## 2024-06-25 RX ADMIN — SODIUM CHLORIDE: 9 INJECTION, SOLUTION INTRAVENOUS at 08:06

## 2024-06-25 RX ADMIN — FENTANYL CITRATE 50 MCG: 50 INJECTION, SOLUTION INTRAMUSCULAR; INTRAVENOUS at 10:06

## 2024-06-25 RX ADMIN — ROCURONIUM BROMIDE 30 MG: 10 INJECTION INTRAVENOUS at 10:06

## 2024-06-25 RX ADMIN — SUGAMMADEX 200 MG: 100 INJECTION, SOLUTION INTRAVENOUS at 11:06

## 2024-06-25 NOTE — ANESTHESIA PROCEDURE NOTES
Intubation    Date/Time: 6/25/2024 10:43 AM    Performed by: Julito Lees MD  Authorized by: Julito Lees MD    Intubation:     Induction:  Intravenous    Intubated:  Postinduction    Mask Ventilation:  Easy mask    Attempts:  1    Attempted By:  CRNA    Method of Intubation:  Video laryngoscopy    Blade:  Wilson 3    Laryngeal View Grade: Grade I - full view of cords      Difficult Airway Encountered?: No      Complications:  None    Airway Device:  Oral endotracheal tube    Airway Device Size:  7.0    Style/Cuff Inflation:  Cuffed    Inflation Amount (mL):  8    Tube secured:  21    Placement Verified By:  Capnometry and Revisualization with laryngoscopy    Complicating Factors:  None    Findings Post-Intubation:  BS equal bilateral

## 2024-06-25 NOTE — BRIEF OP NOTE
[facility] ([facility])  Brief Operative Note    Surgery Date: 2024     Surgeons and Role:     * Marsha Sampson MD - Primary    Assisting Surgeon: None    Pre-op Diagnosis:  Missed  [O02.1]    Post-op Diagnosis:  Post-Op Diagnosis Codes:     * Missed  [O02.1]    Procedure(s) (LRB):  DILATION AND CURETTAGE, UTERUS, USING SUCTION (N/A)    Anesthesia: General    Operative Findings: normal uterus    Estimated Blood Loss: 20 mL         Specimens:   Specimen (24h ago, onward)      Products of conception          Underwent above procedure without difficulty. Tolerated well. Transferred to PACU in stable condition. Uncomplicated postop course. Discharged home in stable condition.      Discharge Note    OUTCOME: Patient tolerated treatment/procedure well without complication and is now ready for discharge.    DISPOSITION: Home or Self Care    FINAL DIAGNOSIS:  Missed     FOLLOWUP: virtual visit in 2 weeks    DISCHARGE INSTRUCTIONS:    Discharge Procedure Orders   Diet general     Call MD for:  temperature >100.4     Call MD for:  persistent nausea and vomiting     Call MD for:  severe uncontrolled pain

## 2024-06-25 NOTE — OP NOTE
[facility] ([facility])  Gynecology  Operative Note    SUMMARY     Date of Procedure: 2024     Procedure: Procedure(s) (LRB):  DILATION AND CURETTAGE, UTERUS, USING SUCTION (N/A)       Surgeons and Role:     * Marsha Sampson MD - Primary    Assisting Surgeon: None    Pre-Operative Diagnosis: Missed  [O02.1]    Post-Operative Diagnosis: Post-Op Diagnosis Codes:     * Missed  [O02.1]    Anesthesia: General    Description of the Findings of the Procedure: products of conception    Complications: No    Estimated Blood Loss (EBL): 20 mL           Specimens:   Specimen (24h ago, onward)       Start     Ordered    24 1055  Specimen to Pathology, Surgery Gynecology and Obstetrics  Once        Comments: Pre-op Diagnosis: Missed  [O02.1]Procedure(s):DILATION AND CURETTAGE, UTERUS, USING SUCTION Number of specimens: 1Name of specimens: 1.Products of Conception -perm     References:    Click here for ordering Quick Tip   Question Answer Comment   Procedure Type: Gynecology and Obstetrics    Specimen Class: Routine/Screening    Release to patient Immediate        24 1114                            Condition: Good    Disposition: PACU - hemodynamically stable.    Attestation: A qualified resident physician was not available.      Procedure in detail:    Patient was taken to the OR and placed in the supine position. General anesthesia was started without difficulty. She was placed in the dorsal lithotomy position. Her vagina was prepped and draped in the usual sterile fashion. Bladder was emptied with straight catheter. A weighted speculum was placed in the vagina. A single tooth tenaculum was used to grasp the anterior lip of the cervix. The cervix was easily and progressively dilated to a #9 Francesca dilator. A 9 mm suction curette was advanced to the uterine fundus and the uterine contents were removed. The suction curette was removed. A sharp curettage was  performed until a gritty texture was noted in all four quadrants. The suction curette was reintroduced to clear the contents of the uterus. The suction curette was removed. The tenaculum was removed. Excellent hemostasis was obtained from the tenaculum sites with application of silver nitrate. The weighted speculum was removed. The patient was awakened and taken to the PACU in stable condition. All counts were correct x 2.

## 2024-06-25 NOTE — PLAN OF CARE
Pt in preop bay 40, VSS, meds given and IV inserted. Pt denies any open wounds on body or the use of any weight loss injections. Pt needs anesthesia consents, otherwise ready to roll. Procedural consents verified with pt.

## 2024-06-25 NOTE — TRANSFER OF CARE
Anesthesia Transfer of Care Note    Patient: Maryjane Land    Procedure(s) Performed: Procedure(s) (LRB):  DILATION AND CURETTAGE, UTERUS, USING SUCTION (N/A)    Patient location: PACU    Anesthesia Type: general    Transport from OR: Transported from OR on 6-10 L/min O2 by face mask with adequate spontaneous ventilation    Post pain: adequate analgesia    Post assessment: no apparent anesthetic complications    Post vital signs: stable    Level of consciousness: awake    Nausea/Vomiting: no nausea/vomiting    Complications: none    Transfer of care protocol was followed    Last vitals: Visit Vitals  /70   Pulse 87   Temp 36.8 °C (98.2 °F) (Temporal)   Resp 18   LMP 04/07/2024 (Exact Date)   SpO2 99%   Breastfeeding No

## 2024-06-25 NOTE — ANESTHESIA PREPROCEDURE EVALUATION
06/25/2024  Maryjane Land is a 28 y.o., female.      Pre-op Assessment    I have reviewed the Patient Summary Reports.     I have reviewed the Nursing Notes. I have reviewed the NPO Status.   I have reviewed the Medications.     Review of Systems  Anesthesia Hx:             Denies Family Hx of Anesthesia complications.    Denies Personal Hx of Anesthesia complications.                      Physical Exam  General: Well nourished    Airway:  Mallampati: II   Mouth Opening: Normal  TM Distance: Normal    Chest/Lungs:  Normal Respiratory Rate    Anesthesia Plan  Type of Anesthesia, risks & benefits discussed:    Anesthesia Type: Gen Natural Airway, Gen Supraglottic Airway  Intra-op Monitoring Plan: Standard ASA Monitors  Post Op Pain Control Plan: multimodal analgesia  Induction:  IV  Informed Consent: Informed consent signed with the Patient and all parties understand the risks and agree with anesthesia plan.  All questions answered.   ASA Score: 1  Day of Surgery Review of History & Physical: H&P Update referred to the surgeon/provider.    Ready For Surgery From Anesthesia Perspective.   .

## 2024-06-25 NOTE — ANESTHESIA POSTPROCEDURE EVALUATION
Anesthesia Post Evaluation    Patient: Maryjane Land    Procedure(s) Performed: Procedure(s) (LRB):  DILATION AND CURETTAGE, UTERUS, USING SUCTION (N/A)    Final Anesthesia Type: general      Patient location during evaluation: PACU  Patient participation: Yes- Able to Participate  Level of consciousness: awake and alert  Post-procedure vital signs: reviewed and stable  Pain management: adequate  Airway patency: patent    PONV status at discharge: No PONV  Anesthetic complications: no      Cardiovascular status: stable  Respiratory status: spontaneous ventilation  Hydration status: euvolemic  Follow-up not needed.          Vitals Value Taken Time   /53 06/25/24 1216   Temp 36.7 °C (98.1 °F) 06/25/24 1123   Pulse 84 06/25/24 1229   Resp 18 06/25/24 1229   SpO2 99 % 06/25/24 1229   Vitals shown include unfiled device data.      Event Time   Out of Recovery 11:55:00         Pain/Marcio Score: Pain Rating Prior to Med Admin: 5 (6/25/2024 12:05 PM)  Marcio Score: 10 (6/25/2024 12:00 PM)

## 2024-06-26 ENCOUNTER — PATIENT MESSAGE (OUTPATIENT)
Dept: OBSTETRICS AND GYNECOLOGY | Facility: CLINIC | Age: 29
End: 2024-06-26
Payer: COMMERCIAL

## 2024-06-27 LAB
FINAL PATHOLOGIC DIAGNOSIS: NORMAL
GROSS: NORMAL
Lab: NORMAL

## 2024-06-29 ENCOUNTER — NURSE TRIAGE (OUTPATIENT)
Dept: ADMINISTRATIVE | Facility: CLINIC | Age: 29
End: 2024-06-29
Payer: COMMERCIAL

## 2024-06-29 NOTE — TELEPHONE ENCOUNTER
D/C , for missed , did not bleed at all yesterday. Felt good.   Did not sleep with a pad last night, woke up, went to bathroom, voided, passed blood. Shower, voided, passed blood, states more like period. No fever, no chills, slight abdominal pain, no bleeding noted on pad. Triage done- dispo Call PCP now    Report to on call, Dr. rGay, report re no bleeding yesterday, woke up, passed blood when voiding, happened again. No fever, no chills, slight abdominal pain. Pad placed to monitor.     Per Dr. Gray, continue to monitor at home, wear pad, call back for 4 pads in 2 hours.     Spoke with patient, advised of above, call OOC back for any questions or concerns, instructed in how to  photo if needed to portal. Verb understanding.     Reason for Disposition   [1] Caller has URGENT question AND [2] triager unable to answer question    Additional Information   Negative: Shock suspected (e.g., cold/pale/clammy skin, too weak to stand, low BP, rapid pulse)   Negative: Difficult to awaken or acting confused (e.g., disoriented, slurred speech)   Negative: Passed out (e.g., fainted, lost consciousness, blacked out and was not responding)   Negative: Sounds like a life-threatening emergency to the triager   Negative: [1] Diagnosed with a threatened miscarriage (threatened ) AND [2] did not have a surgical procedure to remove pregnancy tissue from the uterus (womb)   Negative: [1] Had a medication induced  AND [2] did not have a surgical procedure to remove pregnancy tissue from the uterus (womb)   Negative: [1] Had a miscarriage (spontaneous ) AND [2] did not have a surgical procedure to remove pregnancy tissue from the uterus (womb)   Negative:  took place after 20 weeks of pregnancy   Negative: Breast pain or engorgement is main concern   Negative: Depression is main problem or symptom (e.g., feelings of sadness or hopelessness)   Negative: SEVERE vaginal bleeding  (e.g., soaking 2 pads or tampons per hour and present 2 or more hours; 1 menstrual cup every 2 hours)   Negative: MODERATE vaginal bleeding (e.g., soaking 1 pad or tampon per hour and present > 6 hours; 1 menstrual cup every 6 hours)   Negative: SEVERE dizziness (e.g., unable to stand, requires support to walk, feels like passing out)   Negative: [1] Abdominal pain AND [2] fever 100.4 F (38.0 C) or higher   Negative: SEVERE abdominal pain (e.g., excruciating)   Negative: Pale skin (pallor) of new-onset or worsening   Negative: Severe chills (i.e., feeling extremely cold WITH shaking chills)   Negative: Patient sounds very sick or weak to the triager   Negative: [1] Constant abdominal pain AND [2] present > 2 hours   Negative: Fever 100.4 F (38.0 C) or higher    Protocols used:  - Surgical Procedure Follow-up Call-A-AH

## 2024-07-01 ENCOUNTER — TELEPHONE (OUTPATIENT)
Dept: OBSTETRICS AND GYNECOLOGY | Facility: CLINIC | Age: 29
End: 2024-07-01
Payer: COMMERCIAL

## 2024-07-01 NOTE — TELEPHONE ENCOUNTER
Follow up On Call message.   Pt called on call on   On Call message:  D/C , for missed , did not bleed at all yesterday. Felt good.   Did not sleep with a pad last night, woke up, went to bathroom, voided, passed blood. Shower, voided, passed blood, states more like period. No fever, no chills, slight abdominal pain, no bleeding noted on pad. Triage done- dispo Call PCP now     Report to on call, Dr. Gray, report re no bleeding yesterday, woke up, passed blood when voiding, happened again. No fever, no chills, slight abdominal pain. Pad placed to monitor.      Per Dr. Gray, continue to monitor at home, wear pad, call back for 4 pads in 2 hours.      Spoke with patient, advised of above, call OOC back for any questions or concerns, instructed in how to  photo if needed to portal. Verb understanding.     24 @ [id number]

## 2024-07-08 ENCOUNTER — OFFICE VISIT (OUTPATIENT)
Dept: OBSTETRICS AND GYNECOLOGY | Facility: CLINIC | Age: 29
End: 2024-07-08
Payer: COMMERCIAL

## 2024-07-08 DIAGNOSIS — Z09 POSTOP CHECK: Primary | ICD-10-CM

## 2024-07-08 PROCEDURE — 1159F MED LIST DOCD IN RCRD: CPT | Mod: CPTII,95,, | Performed by: OBSTETRICS & GYNECOLOGY

## 2024-07-08 PROCEDURE — 99024 POSTOP FOLLOW-UP VISIT: CPT | Mod: 95,,, | Performed by: OBSTETRICS & GYNECOLOGY

## 2024-07-08 PROCEDURE — 1160F RVW MEDS BY RX/DR IN RCRD: CPT | Mod: CPTII,95,, | Performed by: OBSTETRICS & GYNECOLOGY

## 2024-07-08 NOTE — PROGRESS NOTES
The patient location is: home  The chief complaint leading to consultation is: postop    Visit type: audiovisual    Face to Face time with patient: 6  10 minutes of total time spent on the encounter, which includes face to face time and non-face to face time preparing to see the patient (eg, review of tests), Obtaining and/or reviewing separately obtained history, Documenting clinical information in the electronic or other health record, Independently interpreting results (not separately reported) and communicating results to the patient/family/caregiver, or Care coordination (not separately reported).         Each patient to whom he or she provides medical services by telemedicine is:  (1) informed of the relationship between the physician and patient and the respective role of any other health care provider with respect to management of the patient; and (2) notified that he or she may decline to receive medical services by telemedicine and may withdraw from such care at any time.    Notes:      Subjective:       Patient ID: Maryjane Land is a 28 y.o. female.    Chief Complaint:  Post-op Evaluation      History of Present Illness  - patient presents for a virtual visit to discuss postop suction D&C.  - occasional pinch type cramp. Bleeding stopped then has had a little the past couple of days.  - overall doing well.    Past Medical History:   Diagnosis Date    Diabetes mellitus     type 1, dx'd at age 4    H/O breast biopsy     Hypothyroidism, unspecified        Past Surgical History:   Procedure Laterality Date    DILATION AND CURETTAGE OF UTERUS USING SUCTION N/A 6/25/2024    Procedure: DILATION AND CURETTAGE, UTERUS, USING SUCTION;  Surgeon: Marsha Sampson MD;  Location: [facility];  Service: OB/GYN;  Laterality: N/A;    MANDIBLE SURGERY Bilateral 08/26/2022    total joint replacements        No family history on file.     Social History     Socioeconomic History    Marital status:     Tobacco Use    Smoking status: Never    Smokeless tobacco: Never   Substance and Sexual Activity    Alcohol use: Yes    Drug use: Never    Sexual activity: Yes     Partners: Male     Social Determinants of Health     Financial Resource Strain: Low Risk  (1/31/2023)    Received from [facility]    Overall Financial Resource Strain (CARDIA)     Difficulty of Paying Living Expenses: Not very hard   Food Insecurity: No Food Insecurity (1/31/2023)    Received from [facility] [employer] [facility] [employer]    Hunger Vital Sign     Worried About Running Out of Food in the Last Year: Never true     Ran Out of Food in the Last Year: Never true   Transportation Needs: No Transportation Needs (1/31/2023)    Received from [facility] [employer] [facility] [employer]    PRAPARE - Transportation     Lack of Transportation (Medical): No     Lack of Transportation (Non-Medical): No   Stress: Stress Concern Present (1/31/2023)    Received from [facility] [employer] [facility]    [employer] [place] [employer] - Occupational Stress Questionnaire     Feeling of Stress : To some extent   Housing Stability: Low Risk  (1/31/2023)    Received from [facility] [employer]    Housing Stability Vital Sign     Unable to Pay for Housing in the Last Year: No     Number of Places Lived in the Last Year: 1     In the last 12 months, was there a time when you did not have a steady place to sleep or slept in a shelter (including now)?: No           Objective:     Gen: AAO x 3, NAD    Virtual visit.    Assessment/ Plan:          Maryjane was seen today for post-op evaluation.    Diagnoses and all orders for this visit:    Postop check    - patient will call or message with any issues.  - cleared for normal activity.  - patient verbalized understanding and agrees with plan.      Follow up for annual exam.    As of April 1, 2021, the Cures Act has been passed nationally. This new law requires that all doctors progress notes, lab results, pathology reports and radiology  reports be released IMMEDIATELY to the patient in the patient portal. That means that the results are released to you at the EXACT same time they are released to me. Therefore, with all of the patients that I have I am not able to reply to each patient exactly when the results come in. So there will be a delay from when you see the results to when I see them and have time to come up with a response to send you. Also I only see these results when I am on the computer at work. So if the results come in over the weekend or after 5 pm of a work day, I will not see them until the next business day. As you can tell, this is a challenge as a physician to give every patient the quick response they hope for and deserve. So please be patient!   Thanks for your understanding and patience.

## 2024-08-26 ENCOUNTER — LAB VISIT (OUTPATIENT)
Dept: LAB | Facility: HOSPITAL | Age: 29
End: 2024-08-26
Attending: OBSTETRICS & GYNECOLOGY
Payer: COMMERCIAL

## 2024-08-26 ENCOUNTER — TELEPHONE (OUTPATIENT)
Dept: OBSTETRICS AND GYNECOLOGY | Facility: CLINIC | Age: 29
End: 2024-08-26
Payer: COMMERCIAL

## 2024-08-26 DIAGNOSIS — Z34.90 PREGNANCY, UNSPECIFIED GESTATIONAL AGE: Primary | ICD-10-CM

## 2024-08-26 DIAGNOSIS — Z34.90 PREGNANCY, UNSPECIFIED GESTATIONAL AGE: ICD-10-CM

## 2024-08-26 LAB
HCG INTACT+B SERPL-ACNC: 88 MIU/ML
PROGEST SERPL-MCNC: 12.4 NG/ML

## 2024-08-26 PROCEDURE — 36415 COLL VENOUS BLD VENIPUNCTURE: CPT | Performed by: OBSTETRICS & GYNECOLOGY

## 2024-08-26 PROCEDURE — 84144 ASSAY OF PROGESTERONE: CPT | Performed by: OBSTETRICS & GYNECOLOGY

## 2024-08-26 PROCEDURE — 84702 CHORIONIC GONADOTROPIN TEST: CPT | Performed by: OBSTETRICS & GYNECOLOGY

## 2024-08-26 NOTE — TELEPHONE ENCOUNTER
Dr Sampson pt calling, just found out she is pregnant had a missed ab over the summer would like to discuss. Pt # [phone]     8/26/24 @ 5049 Returned pt's call. Has confirmation visit scheduled for this Thurs. Pt is concerned due to passed miscarriage and was placed on progesterone with last pregnancy. Orders place for hcg and progesterone today and repeat hcg on Wednesday. Labs scheduled at the [place] location. Pt ariel.

## 2024-08-27 RX ORDER — PROGESTERONE 200 MG/1
200 CAPSULE ORAL NIGHTLY
Qty: 30 CAPSULE | Refills: 0 | Status: SHIPPED | OUTPATIENT
Start: 2024-08-27 | End: 2025-08-27

## 2024-08-28 ENCOUNTER — TELEPHONE (OUTPATIENT)
Dept: OBSTETRICS AND GYNECOLOGY | Facility: CLINIC | Age: 29
End: 2024-08-28
Payer: COMMERCIAL

## 2024-08-28 ENCOUNTER — LAB VISIT (OUTPATIENT)
Dept: LAB | Facility: HOSPITAL | Age: 29
End: 2024-08-28
Attending: OBSTETRICS & GYNECOLOGY
Payer: COMMERCIAL

## 2024-08-28 DIAGNOSIS — Z34.90 PREGNANCY, UNSPECIFIED GESTATIONAL AGE: ICD-10-CM

## 2024-08-28 LAB — HCG INTACT+B SERPL-ACNC: 248 MIU/ML

## 2024-08-28 PROCEDURE — 36415 COLL VENOUS BLD VENIPUNCTURE: CPT | Performed by: OBSTETRICS & GYNECOLOGY

## 2024-08-28 PROCEDURE — 84702 CHORIONIC GONADOTROPIN TEST: CPT | Performed by: OBSTETRICS & GYNECOLOGY

## 2024-08-28 NOTE — TELEPHONE ENCOUNTER
----- Message from Marsha Sampson MD sent at 8/27/2024  9:23 AM CDT -----  See message. Please be sure patient received it.     No

## 2024-08-29 ENCOUNTER — OFFICE VISIT (OUTPATIENT)
Dept: OBSTETRICS AND GYNECOLOGY | Facility: CLINIC | Age: 29
End: 2024-08-29
Payer: COMMERCIAL

## 2024-08-29 ENCOUNTER — PATIENT MESSAGE (OUTPATIENT)
Dept: OBSTETRICS AND GYNECOLOGY | Facility: CLINIC | Age: 29
End: 2024-08-29

## 2024-08-29 VITALS
WEIGHT: 164.69 LBS | BODY MASS INDEX: 29.17 KG/M2 | DIASTOLIC BLOOD PRESSURE: 74 MMHG | SYSTOLIC BLOOD PRESSURE: 128 MMHG

## 2024-08-29 DIAGNOSIS — N91.2 AMENORRHEA: Primary | ICD-10-CM

## 2024-08-29 LAB
B-HCG UR QL: POSITIVE
CTP QC/QA: YES

## 2024-08-29 PROCEDURE — 99999 PR PBB SHADOW E&M-EST. PATIENT-LVL III: CPT | Mod: PBBFAC,,, | Performed by: NURSE PRACTITIONER

## 2024-08-29 PROCEDURE — 87086 URINE CULTURE/COLONY COUNT: CPT | Performed by: NURSE PRACTITIONER

## 2024-08-29 PROCEDURE — 87591 N.GONORRHOEAE DNA AMP PROB: CPT | Performed by: NURSE PRACTITIONER

## 2024-08-29 PROCEDURE — 87491 CHLMYD TRACH DNA AMP PROBE: CPT | Performed by: NURSE PRACTITIONER

## 2024-08-29 NOTE — PROGRESS NOTES
CC: Positive Pregnancy Test    HISTORY OF PRESENT ILLNESS:    Maryjane Land is a 28 y.o. female, ,  Presents today for a routine exam complaining of amenorrhea and positive home urine pregnancy test.  No LMP recorded.   She is not currently on any contraception. Taking PNV and Prometrium 200 mg PO QHS.  Reports breast tenderness. Denies vaginal bleeding and pelvic pain.    Very faint UPT today, recent HCG level: 248 on 24 with initial HC on 24. Progesterone: 12.4 on 24. She suspects later ovulation and likely 4w4d GA today. Regular cycles are 31 day length.     History of missed AB with D&C in .    ROS:  GENERAL: No weight changes. No swelling. No fatigue. No fever.  CARDIOVASCULAR: No chest pain. No shortness of breath. No leg cramps.   NEUROLOGICAL: No headaches. No vision changes.  BREASTS: No pain. No lumps. No discharge.  ABDOMEN: No pain. No diarrhea. No constipation.  REPRODUCTIVE: No abnormal bleeding.   VULVA: No pain. No lesions. No itching.  VAGINA: No relaxation. No itching. No odor. No discharge. No lesions.  URINARY: No incontinence. No nocturia. No frequency. No dysuria.    MEDICATIONS AND ALLERGIES:  Reviewed        COMPREHENSIVE GYN HISTORY:  PAP History: Denies abnormal Paps.  Infection History: Denies STDs. Denies PID.  Benign History: Denies uterine fibroids. Denies ovarian cysts. Denies endometriosis. Denies other conditions.  Cancer History: Denies cervical cancer. Denies uterine cancer or hyperplasia. Denies ovarian cancer. Denies vulvar cancer or pre-cancer. Denies vaginal cancer or pre-cancer. Denies breast cancer. Denies colon cancer.  Sexual Activity History: Reports currently being sexually active  Menstrual History: None.  Contraception: None    /74   Wt 74.7 kg (164 lb 10.9 oz)   Breastfeeding No   BMI 29.17 kg/m²     PE:  AFFECT: Calm, alert and oriented X 3. Interactive during exam  GENERAL: Appears well-nourished, well-developed, in  no acute distress.  HEAD: Normocephalic, atruamatic  SKIN: Normal for race, warm, & dry. No lesions or rashes.  ABDOMEN: Soft and nontender without masses or organomegally.  EXTREMITIES: No cyanosis, clubbing or edema. No calf tenderness.  LYMPH NODES: No axillary or inguinal adenopathy.    PROCEDURES:  UPT Positive  Genprobe    ASSESSMENT/PLAN:  Amenorrhea  Positive urine pregnancy test     -  Routine prenatal care    Nausea and vomiting in pregnancy    -  Education regarding lifestyle and dietary modifications    -  Advised use of B6/Unisom. Pt will notify us if no relief/worsening symptoms, will consider Zofran if needed.      1st TRIMESTER COUNSELING:   Common complaints of pregnancy  HIV and other routine prenatal tests including  genetic screening  Risk factors identified by prenatal history  Oriented to practice - discussed anticipated course of prenatal care & indications for Ultrasound  Childbirth classes/Hospital facilities   Nutrition and weight gain counseling  Toxoplasmosis precautions (Cats/Raw Meat)  Sexual activity and exercise  Environmental/Work hazards  Travel  Tobacco (Ask, Advise, Assess, Assist, and Arrange), as well as alcohol and drug use  Use of any medications (Including supplements, Vitamins, Herbs, or OTC Drugs)  Domestic violence  Seat belt use      TERATOLOGY COUNSELING:   Discussed indications and options for aneuploidy screening - pamphlets given    -  Pt desires MT21, brochure given, she will contact to discuss out of pocket cost     HCG/progesterone level scheduled for Saturday at [facility], recommended against any additional assessment of these levels if in range.   Prometrium increased to 400 mg daily.     Dating US 4 weeks  FOLLOW-UP in 4 weeks Dr. Adrián Islas for OB management.        Wendy Melendez, CHAPIN    OB/GYN

## 2024-08-30 LAB
BACTERIA UR CULT: NORMAL
BACTERIA UR CULT: NORMAL
C TRACH DNA SPEC QL NAA+PROBE: NOT DETECTED
N GONORRHOEA DNA SPEC QL NAA+PROBE: NOT DETECTED

## 2024-08-31 ENCOUNTER — LAB VISIT (OUTPATIENT)
Dept: LAB | Facility: HOSPITAL | Age: 29
End: 2024-08-31
Payer: COMMERCIAL

## 2024-08-31 DIAGNOSIS — N91.2 AMENORRHEA: ICD-10-CM

## 2024-08-31 LAB
HCG INTACT+B SERPL-ACNC: 672 MIU/ML
PROGEST SERPL-MCNC: 18.2 NG/ML

## 2024-08-31 PROCEDURE — 84702 CHORIONIC GONADOTROPIN TEST: CPT | Performed by: NURSE PRACTITIONER

## 2024-08-31 PROCEDURE — 84144 ASSAY OF PROGESTERONE: CPT | Performed by: NURSE PRACTITIONER

## 2024-08-31 PROCEDURE — 36415 COLL VENOUS BLD VENIPUNCTURE: CPT | Performed by: NURSE PRACTITIONER

## 2024-09-08 ENCOUNTER — PATIENT MESSAGE (OUTPATIENT)
Dept: OBSTETRICS AND GYNECOLOGY | Facility: CLINIC | Age: 29
End: 2024-09-08
Payer: COMMERCIAL

## 2024-09-08 DIAGNOSIS — O26.851 SPOTTING AFFECTING PREGNANCY IN FIRST TRIMESTER: Primary | ICD-10-CM

## 2024-09-09 ENCOUNTER — LAB VISIT (OUTPATIENT)
Dept: LAB | Facility: HOSPITAL | Age: 29
End: 2024-09-09
Attending: OBSTETRICS & GYNECOLOGY
Payer: COMMERCIAL

## 2024-09-09 DIAGNOSIS — O26.851 SPOTTING AFFECTING PREGNANCY IN FIRST TRIMESTER: ICD-10-CM

## 2024-09-09 LAB — HCG INTACT+B SERPL-ACNC: NORMAL MIU/ML

## 2024-09-09 PROCEDURE — 84702 CHORIONIC GONADOTROPIN TEST: CPT | Performed by: OBSTETRICS & GYNECOLOGY

## 2024-09-09 PROCEDURE — 36415 COLL VENOUS BLD VENIPUNCTURE: CPT | Performed by: OBSTETRICS & GYNECOLOGY

## 2024-09-09 NOTE — TELEPHONE ENCOUNTER
Ok to repeat bhCG but for reassurance it would need to be repeated 48 hours later to see an appropriate rise.

## 2024-09-10 ENCOUNTER — TELEPHONE (OUTPATIENT)
Dept: OBSTETRICS AND GYNECOLOGY | Facility: CLINIC | Age: 29
End: 2024-09-10
Payer: COMMERCIAL

## 2024-09-10 DIAGNOSIS — O26.851 SPOTTING AFFECTING PREGNANCY IN FIRST TRIMESTER: Primary | ICD-10-CM

## 2024-09-10 NOTE — TELEPHONE ENCOUNTER
----- Message from Marsha Sampson MD sent at 9/10/2024 10:08 AM CDT -----  Patient will be 7 weeks next Monday. Move u/s up a week to coordinate with initial OB with me. We discussed this yesterday. Please call her.    Spoke with patient today. Spotting has completely stopped. No further labs needed.

## 2024-09-10 NOTE — TELEPHONE ENCOUNTER
Spoke with pt, scheduled US prior to visit with Dr. Sampson,  no labs tomorrow.  Cx appt with Wendy.

## 2024-09-17 RX ORDER — CITALOPRAM 20 MG/1
20 TABLET, FILM COATED ORAL DAILY
Qty: 90 TABLET | Refills: 2 | Status: SHIPPED | OUTPATIENT
Start: 2024-09-17

## 2024-09-17 NOTE — TELEPHONE ENCOUNTER
Refill Routing Note   Medication(s) are not appropriate for processing by [facility] for the following reason(s):        No active prescription written by provider    [facility] action(s):  Defer        Medication Therapy Plan: LOV 7/8/24; FOV in 2 days      Appointments  past 12m or future 3m with PCP    Date Provider   Last Visit   7/8/2024 Marsha Sampson MD   Next Visit   9/19/2024 Marsha Sampson MD   ED visits in past 90 days: 0        Note composed:10:13 AM 09/17/2024

## 2024-09-19 ENCOUNTER — PROCEDURE VISIT (OUTPATIENT)
Dept: OBSTETRICS AND GYNECOLOGY | Facility: CLINIC | Age: 29
End: 2024-09-19
Payer: COMMERCIAL

## 2024-09-19 ENCOUNTER — LAB VISIT (OUTPATIENT)
Dept: LAB | Facility: HOSPITAL | Age: 29
End: 2024-09-19
Attending: OBSTETRICS & GYNECOLOGY
Payer: COMMERCIAL

## 2024-09-19 ENCOUNTER — INITIAL PRENATAL (OUTPATIENT)
Dept: OBSTETRICS AND GYNECOLOGY | Facility: CLINIC | Age: 29
End: 2024-09-19
Payer: COMMERCIAL

## 2024-09-19 VITALS
DIASTOLIC BLOOD PRESSURE: 83 MMHG | BODY MASS INDEX: 29.48 KG/M2 | SYSTOLIC BLOOD PRESSURE: 123 MMHG | WEIGHT: 166.44 LBS

## 2024-09-19 DIAGNOSIS — O26.851 SPOTTING AFFECTING PREGNANCY IN FIRST TRIMESTER: ICD-10-CM

## 2024-09-19 DIAGNOSIS — O09.291 PREGNANCY WITH HISTORY OF MISCARRIAGE, FIRST TRIMESTER: Primary | ICD-10-CM

## 2024-09-19 DIAGNOSIS — Z3A.01 7 WEEKS GESTATION OF PREGNANCY: ICD-10-CM

## 2024-09-19 DIAGNOSIS — Z36.89 ENCOUNTER FOR FETAL ANATOMIC SURVEY: ICD-10-CM

## 2024-09-19 LAB
ABO + RH BLD: NORMAL
ALBUMIN SERPL BCP-MCNC: 3.8 G/DL (ref 3.5–5.2)
ALP SERPL-CCNC: 60 U/L (ref 55–135)
ALT SERPL W/O P-5'-P-CCNC: 13 U/L (ref 10–44)
ANION GAP SERPL CALC-SCNC: 10 MMOL/L (ref 8–16)
AST SERPL-CCNC: 19 U/L (ref 10–40)
BASOPHILS # BLD AUTO: 0.07 K/UL (ref 0–0.2)
BASOPHILS NFR BLD: 0.6 % (ref 0–1.9)
BILIRUB SERPL-MCNC: 0.3 MG/DL (ref 0.1–1)
BLD GP AB SCN CELLS X3 SERPL QL: NORMAL
BUN SERPL-MCNC: 11 MG/DL (ref 6–20)
CALCIUM SERPL-MCNC: 9.2 MG/DL (ref 8.7–10.5)
CHLORIDE SERPL-SCNC: 106 MMOL/L (ref 95–110)
CO2 SERPL-SCNC: 18 MMOL/L (ref 23–29)
CREAT SERPL-MCNC: 0.7 MG/DL (ref 0.5–1.4)
DIFFERENTIAL METHOD BLD: ABNORMAL
EOSINOPHIL # BLD AUTO: 0 K/UL (ref 0–0.5)
EOSINOPHIL NFR BLD: 0.2 % (ref 0–8)
ERYTHROCYTE [DISTWIDTH] IN BLOOD BY AUTOMATED COUNT: 12 % (ref 11.5–14.5)
EST. GFR  (NO RACE VARIABLE): >60 ML/MIN/1.73 M^2
GLUCOSE SERPL-MCNC: 75 MG/DL (ref 70–110)
HBV SURFACE AG SERPL QL IA: NORMAL
HCT VFR BLD AUTO: 36.9 % (ref 37–48.5)
HCV AB SERPL QL IA: NORMAL
HGB BLD-MCNC: 12.1 G/DL (ref 12–16)
HIV 1+2 AB+HIV1 P24 AG SERPL QL IA: NORMAL
IMM GRANULOCYTES # BLD AUTO: 0.05 K/UL (ref 0–0.04)
IMM GRANULOCYTES NFR BLD AUTO: 0.4 % (ref 0–0.5)
LYMPHOCYTES # BLD AUTO: 1.8 K/UL (ref 1–4.8)
LYMPHOCYTES NFR BLD: 15 % (ref 18–48)
MCH RBC QN AUTO: 27.8 PG (ref 27–31)
MCHC RBC AUTO-ENTMCNC: 32.8 G/DL (ref 32–36)
MCV RBC AUTO: 85 FL (ref 82–98)
MONOCYTES # BLD AUTO: 1.1 K/UL (ref 0.3–1)
MONOCYTES NFR BLD: 9.2 % (ref 4–15)
NEUTROPHILS # BLD AUTO: 9.1 K/UL (ref 1.8–7.7)
NEUTROPHILS NFR BLD: 74.6 % (ref 38–73)
NRBC BLD-RTO: 0 /100 WBC
PLATELET # BLD AUTO: 386 K/UL (ref 150–450)
PMV BLD AUTO: 11.2 FL (ref 9.2–12.9)
POTASSIUM SERPL-SCNC: 3.6 MMOL/L (ref 3.5–5.1)
PROT SERPL-MCNC: 7.4 G/DL (ref 6–8.4)
RBC # BLD AUTO: 4.35 M/UL (ref 4–5.4)
SODIUM SERPL-SCNC: 134 MMOL/L (ref 136–145)
SPECIMEN OUTDATE: NORMAL
TREPONEMA PALLIDUM IGG+IGM AB [PRESENCE] IN SERUM OR PLASMA BY IMMUNOASSAY: NONREACTIVE
TSH SERPL DL<=0.005 MIU/L-ACNC: 1.03 UIU/ML (ref 0.4–4)
WBC # BLD AUTO: 12.22 K/UL (ref 3.9–12.7)

## 2024-09-19 PROCEDURE — 80053 COMPREHEN METABOLIC PANEL: CPT | Performed by: OBSTETRICS & GYNECOLOGY

## 2024-09-19 PROCEDURE — 86901 BLOOD TYPING SEROLOGIC RH(D): CPT | Performed by: OBSTETRICS & GYNECOLOGY

## 2024-09-19 PROCEDURE — 0502F SUBSEQUENT PRENATAL CARE: CPT | Mod: CPTII,S$GLB,, | Performed by: OBSTETRICS & GYNECOLOGY

## 2024-09-19 PROCEDURE — 87340 HEPATITIS B SURFACE AG IA: CPT | Performed by: OBSTETRICS & GYNECOLOGY

## 2024-09-19 PROCEDURE — 86803 HEPATITIS C AB TEST: CPT | Performed by: OBSTETRICS & GYNECOLOGY

## 2024-09-19 PROCEDURE — 86900 BLOOD TYPING SEROLOGIC ABO: CPT | Performed by: OBSTETRICS & GYNECOLOGY

## 2024-09-19 PROCEDURE — 86762 RUBELLA ANTIBODY: CPT | Performed by: OBSTETRICS & GYNECOLOGY

## 2024-09-19 PROCEDURE — 86593 SYPHILIS TEST NON-TREP QUANT: CPT | Performed by: OBSTETRICS & GYNECOLOGY

## 2024-09-19 PROCEDURE — 87389 HIV-1 AG W/HIV-1&-2 AB AG IA: CPT | Performed by: OBSTETRICS & GYNECOLOGY

## 2024-09-19 PROCEDURE — 76801 OB US < 14 WKS SINGLE FETUS: CPT | Mod: S$GLB,,, | Performed by: OBSTETRICS & GYNECOLOGY

## 2024-09-19 PROCEDURE — 85025 COMPLETE CBC W/AUTO DIFF WBC: CPT | Performed by: OBSTETRICS & GYNECOLOGY

## 2024-09-19 PROCEDURE — 86850 RBC ANTIBODY SCREEN: CPT | Performed by: OBSTETRICS & GYNECOLOGY

## 2024-09-19 PROCEDURE — 36415 COLL VENOUS BLD VENIPUNCTURE: CPT | Performed by: OBSTETRICS & GYNECOLOGY

## 2024-09-19 PROCEDURE — 84443 ASSAY THYROID STIM HORMONE: CPT | Performed by: OBSTETRICS & GYNECOLOGY

## 2024-09-19 PROCEDURE — 99999 PR PBB SHADOW E&M-EST. PATIENT-LVL III: CPT | Mod: PBBFAC,,, | Performed by: OBSTETRICS & GYNECOLOGY

## 2024-09-19 NOTE — PROGRESS NOTES
No complaints. Has continued to have a little bleeding on and off. Blood sugars are well-controlled: HgA1C is 5.9. Reviewed u/s and set dates. Gave spotting/cramping precautions. Continue plan per Endo. Call with any issues.

## 2024-09-20 LAB
RUBV IGG SER-ACNC: 231 IU/ML
RUBV IGG SER-IMP: REACTIVE

## 2024-10-04 ENCOUNTER — PATIENT MESSAGE (OUTPATIENT)
Dept: OBSTETRICS AND GYNECOLOGY | Facility: CLINIC | Age: 29
End: 2024-10-04
Payer: COMMERCIAL

## 2024-10-04 NOTE — TELEPHONE ENCOUNTER
10/4/24 @ 1321 Called pt to discuss further.     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     Pt instructed to avoid heavy lifting, ensure good hydration and take it easy,  monitor if she should start to have heavy / severe cramping or should the spotting increase or should she start to bleed heavily she should go to the ER. Will make sure Dr Sampson is aware. Encouraged pt to call back if she has any further questions and also the on call nurses are available 24/7. Pt ariel.

## 2024-10-07 ENCOUNTER — PROCEDURE VISIT (OUTPATIENT)
Dept: OBSTETRICS AND GYNECOLOGY | Facility: CLINIC | Age: 29
End: 2024-10-07
Payer: COMMERCIAL

## 2024-10-07 DIAGNOSIS — O20.0 THREATENED ABORTION: ICD-10-CM

## 2024-10-07 PROCEDURE — 76815 OB US LIMITED FETUS(S): CPT | Mod: S$GLB,,, | Performed by: OBSTETRICS & GYNECOLOGY

## 2024-10-16 ENCOUNTER — PATIENT MESSAGE (OUTPATIENT)
Dept: OBSTETRICS AND GYNECOLOGY | Facility: CLINIC | Age: 29
End: 2024-10-16
Payer: COMMERCIAL

## 2024-10-17 ENCOUNTER — ROUTINE PRENATAL (OUTPATIENT)
Dept: OBSTETRICS AND GYNECOLOGY | Facility: CLINIC | Age: 29
End: 2024-10-17
Payer: COMMERCIAL

## 2024-10-17 VITALS — WEIGHT: 167.13 LBS | SYSTOLIC BLOOD PRESSURE: 120 MMHG | BODY MASS INDEX: 29.6 KG/M2 | DIASTOLIC BLOOD PRESSURE: 74 MMHG

## 2024-10-17 DIAGNOSIS — Z23 NEED FOR VACCINATION: ICD-10-CM

## 2024-10-17 DIAGNOSIS — Z3A.11 11 WEEKS GESTATION OF PREGNANCY: ICD-10-CM

## 2024-10-17 DIAGNOSIS — O09.291 PREGNANCY WITH HISTORY OF MISCARRIAGE, FIRST TRIMESTER: Primary | ICD-10-CM

## 2024-10-17 DIAGNOSIS — R31.9 HEMATURIA, UNSPECIFIED TYPE: ICD-10-CM

## 2024-10-17 PROCEDURE — 90656 IIV3 VACC NO PRSV 0.5 ML IM: CPT | Mod: S$GLB,,, | Performed by: OBSTETRICS & GYNECOLOGY

## 2024-10-17 PROCEDURE — 87491 CHLMYD TRACH DNA AMP PROBE: CPT | Performed by: OBSTETRICS & GYNECOLOGY

## 2024-10-17 PROCEDURE — 0502F SUBSEQUENT PRENATAL CARE: CPT | Mod: CPTII,S$GLB,, | Performed by: OBSTETRICS & GYNECOLOGY

## 2024-10-17 PROCEDURE — 87591 N.GONORRHOEAE DNA AMP PROB: CPT | Performed by: OBSTETRICS & GYNECOLOGY

## 2024-10-17 PROCEDURE — 90471 IMMUNIZATION ADMIN: CPT | Mod: S$GLB,,, | Performed by: OBSTETRICS & GYNECOLOGY

## 2024-10-17 PROCEDURE — 87086 URINE CULTURE/COLONY COUNT: CPT | Performed by: OBSTETRICS & GYNECOLOGY

## 2024-10-17 PROCEDURE — 99999 PR PBB SHADOW E&M-EST. PATIENT-LVL III: CPT | Mod: PBBFAC,,, | Performed by: OBSTETRICS & GYNECOLOGY

## 2024-10-17 NOTE — PROGRESS NOTES
+2 blood in urine. Will send for cx        Ordering Provider: Marsha Sampson MD    Pt received Flu to R Deltoid.  Pt tolerated injection well.  Pt was instructed to wait 15 minutes to monitor for signs and symptoms of any reactions.  Pt has no further questions, comments, or concerns at this time.

## 2024-10-17 NOTE — PROGRESS NOTES
Has one day per week of bright red blood. Other days with no issues. + FHTs with Vscan and Doppler. Reassured. Flu shot today. Signed Connected MOM order.

## 2024-10-31 ENCOUNTER — PATIENT MESSAGE (OUTPATIENT)
Dept: OBSTETRICS AND GYNECOLOGY | Facility: CLINIC | Age: 29
End: 2024-10-31
Payer: COMMERCIAL

## 2024-10-31 ENCOUNTER — TELEPHONE (OUTPATIENT)
Dept: OBSTETRICS AND GYNECOLOGY | Facility: CLINIC | Age: 29
End: 2024-10-31
Payer: COMMERCIAL

## 2024-11-01 ENCOUNTER — ROUTINE PRENATAL (OUTPATIENT)
Dept: OBSTETRICS AND GYNECOLOGY | Facility: CLINIC | Age: 29
End: 2024-11-01
Payer: COMMERCIAL

## 2024-11-01 ENCOUNTER — PATIENT MESSAGE (OUTPATIENT)
Dept: OBSTETRICS AND GYNECOLOGY | Facility: CLINIC | Age: 29
End: 2024-11-01
Payer: COMMERCIAL

## 2024-11-01 VITALS
DIASTOLIC BLOOD PRESSURE: 70 MMHG | BODY MASS INDEX: 28.02 KG/M2 | WEIGHT: 158.19 LBS | SYSTOLIC BLOOD PRESSURE: 128 MMHG

## 2024-11-01 DIAGNOSIS — O20.9 VAGINAL BLEEDING IN PREGNANCY, FIRST TRIMESTER: Primary | ICD-10-CM

## 2024-11-01 PROCEDURE — 0352U VAGINOSIS SCREEN BY DNA PROBE: CPT | Performed by: OBSTETRICS & GYNECOLOGY

## 2024-11-01 PROCEDURE — 99999 PR PBB SHADOW E&M-EST. PATIENT-LVL III: CPT | Mod: PBBFAC,,, | Performed by: OBSTETRICS & GYNECOLOGY

## 2024-11-01 PROCEDURE — 0502F SUBSEQUENT PRENATAL CARE: CPT | Mod: CPTII,S$GLB,, | Performed by: OBSTETRICS & GYNECOLOGY

## 2024-11-02 LAB
BACTERIAL VAGINOSIS DNA: NOT DETECTED
CANDIDA GLABRATA/KRUSEI: NOT DETECTED
CANDIDA RRNA VAG QL PROBE: NOT DETECTED
TRICHOMONAS VAGINALIS: NOT DETECTED

## 2024-11-05 ENCOUNTER — TELEPHONE (OUTPATIENT)
Dept: OBSTETRICS AND GYNECOLOGY | Facility: CLINIC | Age: 29
End: 2024-11-05
Payer: COMMERCIAL

## 2024-11-05 DIAGNOSIS — Z36.9 ENCOUNTER FOR FETAL ULTRASOUND: Primary | ICD-10-CM

## 2024-11-05 NOTE — TELEPHONE ENCOUNTER
11/5/24 @ 1423 Since last Friday, pt has had an increase in vaginal bleeding. States she is passing clots and blood when on the toilet. She is unsure of how much bleeding is ok, states blood is all bright red and fresh. Pt is concerned, everything was fine on Friday when she saw Dr Ruiz but she does not know how much bleeding is to much. Pt instructed I will speak with Dr Sampson and follow up with her. Pt ariel.

## 2024-11-06 ENCOUNTER — PROCEDURE VISIT (OUTPATIENT)
Dept: MATERNAL FETAL MEDICINE | Facility: CLINIC | Age: 29
End: 2024-11-06
Payer: COMMERCIAL

## 2024-11-06 DIAGNOSIS — Z36.9 ENCOUNTER FOR FETAL ULTRASOUND: ICD-10-CM

## 2024-11-06 PROCEDURE — 76816 OB US FOLLOW-UP PER FETUS: CPT | Mod: S$GLB,,, | Performed by: OBSTETRICS & GYNECOLOGY

## 2024-11-07 ENCOUNTER — PATIENT MESSAGE (OUTPATIENT)
Dept: ADMINISTRATIVE | Facility: OTHER | Age: 29
End: 2024-11-07
Payer: COMMERCIAL

## 2024-11-11 ENCOUNTER — PROCEDURE VISIT (OUTPATIENT)
Dept: MATERNAL FETAL MEDICINE | Facility: CLINIC | Age: 29
End: 2024-11-11
Payer: COMMERCIAL

## 2024-11-11 ENCOUNTER — OFFICE VISIT (OUTPATIENT)
Dept: MATERNAL FETAL MEDICINE | Facility: CLINIC | Age: 29
End: 2024-11-11
Payer: COMMERCIAL

## 2024-11-11 VITALS
BODY MASS INDEX: 29.09 KG/M2 | DIASTOLIC BLOOD PRESSURE: 79 MMHG | WEIGHT: 164.25 LBS | SYSTOLIC BLOOD PRESSURE: 128 MMHG

## 2024-11-11 DIAGNOSIS — O20.9 VAGINAL BLEEDING IN PREGNANCY, FIRST TRIMESTER: ICD-10-CM

## 2024-11-11 DIAGNOSIS — E03.9 HYPOTHYROIDISM AFFECTING PREGNANCY IN SECOND TRIMESTER: ICD-10-CM

## 2024-11-11 DIAGNOSIS — O46.8X2 SUBCHORIONIC HEMATOMA IN SECOND TRIMESTER, SINGLE OR UNSPECIFIED FETUS: ICD-10-CM

## 2024-11-11 DIAGNOSIS — O24.319 MATERNAL PREGESTATIONAL DIABETES CLASSES B THROUGH R, ANTEPARTUM: Primary | ICD-10-CM

## 2024-11-11 DIAGNOSIS — O41.8X20 SUBCHORIONIC HEMATOMA IN SECOND TRIMESTER, SINGLE OR UNSPECIFIED FETUS: ICD-10-CM

## 2024-11-11 DIAGNOSIS — O99.282 HYPOTHYROIDISM AFFECTING PREGNANCY IN SECOND TRIMESTER: ICD-10-CM

## 2024-11-11 DIAGNOSIS — Z36.2 ENCOUNTER FOR FOLLOW-UP ULTRASOUND OF FETAL ANATOMY: Primary | ICD-10-CM

## 2024-11-11 PROBLEM — O02.1 MISSED ABORTION: Status: RESOLVED | Noted: 2024-06-25 | Resolved: 2024-11-11

## 2024-11-11 PROCEDURE — 1159F MED LIST DOCD IN RCRD: CPT | Mod: CPTII,S$GLB,, | Performed by: OBSTETRICS & GYNECOLOGY

## 2024-11-11 PROCEDURE — 76815 OB US LIMITED FETUS(S): CPT | Mod: S$GLB,,, | Performed by: OBSTETRICS & GYNECOLOGY

## 2024-11-11 PROCEDURE — 99215 OFFICE O/P EST HI 40 MIN: CPT | Mod: S$GLB,,, | Performed by: OBSTETRICS & GYNECOLOGY

## 2024-11-11 PROCEDURE — 3074F SYST BP LT 130 MM HG: CPT | Mod: CPTII,S$GLB,, | Performed by: OBSTETRICS & GYNECOLOGY

## 2024-11-11 PROCEDURE — 3078F DIAST BP <80 MM HG: CPT | Mod: CPTII,S$GLB,, | Performed by: OBSTETRICS & GYNECOLOGY

## 2024-11-11 PROCEDURE — 3008F BODY MASS INDEX DOCD: CPT | Mod: CPTII,S$GLB,, | Performed by: OBSTETRICS & GYNECOLOGY

## 2024-11-11 PROCEDURE — 99999 PR PBB SHADOW E&M-EST. PATIENT-LVL III: CPT | Mod: PBBFAC,,, | Performed by: OBSTETRICS & GYNECOLOGY

## 2024-11-11 NOTE — ASSESSMENT & PLAN NOTE
She was diagnosed with hypothyroidism is 4/24. Managed by Dr. Cid (endocrine).   Lab Results   Component Value Date    TSH 1.031 09/19/2024     On Synthroid 25 mcg 5d/wk/50 mcg 2 d/wk. No sx.     Thyroid requirements increase in pregnancy due to increases in metabolism and thyroid binding globulin. Thyroid function tests should be followed closely to ensure adequate treatment.  Hypothyroidism has been associated with higher pregnancy complication rates including miscarriage, preeclampsia, abruption, low birth weight and stillbirth but when well controlled, the risks are not increased.  Untreated hypothyroidism has also been associated with adverse fetal neurological development, but well treated hypothyroidism (i.e., euthyroid state) carries an excellent prognosis for mother and baby.      Recommendations:  Medication management:  Continue Synthroid at the current dose   Up to 1/3 of women may require increased hormone replacement in the first trimester,     Laboratory evaluation:   TSH should be monitored at least every trimester, or every 4 weeks after any medication adjustment. Adjust treatment as necessary to maintain TSH goal < 2.5 mIU/L.

## 2024-11-11 NOTE — ASSESSMENT & PLAN NOTE
She has been having vaginal bleeding for the last several weeks of pregnancy.  It started out with bright red bleeding and passage of clots.  She was on progesterone as well.  She stopped the progesterone about a week ago.  She has been noted to have a subchorionic hematoma.  Now she is mainly having old blood with smaller amounts of bleeding noted.  She has not had any significant cramping with the bleeding recently.    On ultrasound today a 6.8 x 6.7 x 2.8 cm subchorionic hematoma is seen at the edge of the placenta.  There is no retroplacental clot.  The amniotic fluid volume is normal.    I counseled her regarding today's ultrasound findings.  We discussed that subchorionic hematoma can be associated with an increased risk for pregnancy complications including  delivery.  I explained that there is no particular intervention at this point except avoiding any strenuous activities.  We discussed how subchorionic hematomas develop.    I explained that we would plan to re-evaluate the clot at the time of her anatomy scan but in the interim I would not recommend any particular intervention.  Because of the bleeding I told her I would hold off at this time on low-dose aspirin.

## 2024-11-11 NOTE — PROGRESS NOTES
13w2d  Patient presents with complaint of recurrent episodes of vaginal bleeding. She reports had increase of dark blood with small clot pass in overnight/this AM. No cramping that she can feel. No abnormal discharge otherwise.   Dating ultrasound reviewed and follow up ultrasound for bleeding done recently normal.  SSE: No vulvar or vaginal discharge or lesions noted. Dark bloody mucous discharge present on walls of vagina only. No active bleeding noted. Os closed. Vaginosis culture obtained for work up and urine culture.  Reviewed findings with patient. Recommend MFM referral. Good fetal heart tones today.  ED precautions reviewed.  RTC as scheduled with primary OB

## 2024-11-11 NOTE — ASSESSMENT & PLAN NOTE
She has been diabetic for >15 years. She is followed by Dr. Cid (endocrine). She has a Dexcom and a tandem pump. She reports that her last A1c was 5.6%. She is seen by endocrine weekly and reports excellent glycemic control.   The patient was counseled regarding the risks of diabetes in pregnancy. These risks include but are not limited to an increased risk of , preeclampsia/gestational hypertension, fetal structural anomalies, macrosomia, prematurity, and  complications, especially in those patients with poor glucose control. I also discussed with the patient the need for increased fetal surveillance with serial fetal growth assessments and third trimester fetal testing. I also reviewed the possibility of need for early delivery in those with poor glucose control or evidence of fetal growth abnormalities.    Recommendations (Please refer to [facility] guidelines):  Baseline evaluation (to be ordered by primary OB provider):  24 hour urine protein or urine P/C ratio, CBC, CMP  Maternal EKG if not done in the last 2 years  She reports regular eye exams  Hemoglobin A1C every trimester  Fasting and 2-hour postprandial glucose monitoring.   Goals of fasting levels below 95 mg/dL and postprandial levels below 120 mg/dL.   Continue follow-up with endocrine  Medications:   Insulin pump with Continuous Glucose Meter (CGM)  The patient has an insulin pump and CGM. She follows with Endocrinology (Primary MD/NP Dr. Cid)  Ultrasounds:  Targeted anatomy survey at 20 weeks--scheduled  Serial growth ultrasounds q 4-6 weeks at 26-28 weeks  A fetal echocardiogram is recommended at 22-24 weeks with pediatric cardiology  Prenatal testing  Twice weekly BPP/ NST + AFV starting at 32 weeks. (Should be ordered and arranged by the patient's primary OB provider).  Delivery timin 0/7 to 38 and 6/7 weeks if under good control without comorbidities  37 0/7 to 37 and 6/7 weeks if longstanding diabetes or poorly  controlled, polyhydramnios, EFW>90th percentile, or BMI >= 40  36 0/7 to 37 and 6/7 weeks if vascular complications, prior stillbirth or other complicating conditions.  Recommend consideration of earlier delivery if IUGR, HTN, or other complications  Recommend offering  for delivery is EFW is 4500g or more near the time of delivery  Insulin management during labor and delivery  Type 1 diabetics require an infusion of glucose (D5 ½ NS) and insulin to avoid ketosis and hypoglycemia; the insulin infusion can be temporarily paused if hypoglycemia is noted and additional dextrose (D10 or ½ amp of D50) can be administered  Patients who are well controlled with an insulin pump can continue during labor and delivery. Recommend primary OB ensure pump site is out of operative field and confirm pump is compatible with and able to be left on during surgery.  Postpartum management  Insulin should be reduced by 1/2 of the pre-delivery dose within the first 6-24 hours following delivery.  Patients who are breastfeeding should be advised to have small snacks before breastfeeding to reduce the risk of hypoglycemia.  Patients should be referred to primary MD or Endocrinology for postpartum management.    She was also advised that if she notes nausea/vomiting, inability to tolerate PO, or concerns about being able to take her insulin that she should contact her provider or present to the OB ED.

## 2024-11-11 NOTE — PROGRESS NOTES
MATERNAL-FETAL MEDICINE   CONSULT NOTE    Provider requesting consultation: Dr. Sampson    SUBJECTIVE:     Ms. Maryjane Land is a 28 y.o.  female with IUP at 14w5d who is seen in consultation by MFM for evaluation and management of:  Problem   Maternal Pregestational Diabetes Classes B Through R, Antepartum   Subchorionic Hematoma in Second Trimester   Hypothyroidism Affecting Pregnancy in Second Trimester   Missed  (Resolved)            Medication List with Changes/Refills   Current Medications    CITALOPRAM (CELEXA) 20 MG TABLET    Take 1 tablet (20 mg total) by mouth once daily.    NOVOLOG U-100 INSULIN ASPART 100 UNIT/ML INJECTION    Inject into the skin.    PRENATAL VIT/IRON FUM/FOLIC AC (PRENATAL 1+1 ORAL)    Take by mouth once daily.    SYNTHROID 25 MCG TABLET    Take 25 mcg by mouth every morning. Takes 50mcg twice a week   Discontinued Medications    PROGESTERONE (PROMETRIUM) 200 MG CAPSULE    Take 1 capsule (200 mg total) by mouth nightly.       Review of patient's allergies indicates:   Allergen Reactions    Gum mastic Blisters, Dermatitis, Hives, Itching, Rash and Swelling    Gum zswiju-pevils-ugan-alcohol Hives and Other (See Comments)       PMH:  Past Medical History:   Diagnosis Date    Diabetes mellitus     type 1, dx'd at age 4    H/O breast biopsy     Hypothyroidism, unspecified        PObHx:  OB History    Para Term  AB Living   2 0 0 0 1 0   SAB IAB Ectopic Multiple Live Births   1 0 0 0 0      # Outcome Date GA Lbr Sarbjit/2nd Weight Sex Type Anes PTL Lv   2 Current            1 SAB 2024 6w0d    SAB          PSH:  Past Surgical History:   Procedure Laterality Date    DILATION AND CURETTAGE OF UTERUS USING SUCTION N/A 2024    Procedure: DILATION AND CURETTAGE, UTERUS, USING SUCTION;  Surgeon: Marsha Sampson MD;  Location: [facility];  Service: OB/GYN;  Laterality: N/A;    MANDIBLE SURGERY Bilateral 2022    total joint replacements       Family  history:family history is not on file.    Social history: reports that she has never smoked. She has never used smokeless tobacco. She reports that she does not currently use alcohol. She reports that she does not use drugs.    Genetic history:  The patient denies any inherited genetic diseases or birth defects in herself or her partner's personal history or family.    Objective:   /79 (BP Location: Left arm, Patient Position: Sitting)   Wt 74.5 kg (164 lb 3.9 oz)   LMP 2024 (Exact Date)   BMI 29.09 kg/m²         Ultrasound performed. See viewpoint for full ultrasound report.          ASSESSMENT/PLAN:     28 y.o.  female with IUP at 14w5d     Hypothyroidism affecting pregnancy in second trimester  She was diagnosed with hypothyroidism is . Managed by Dr. Cid (endocrine).   Lab Results   Component Value Date    TSH 1.031 2024     On Synthroid 25 mcg 5d/wk/50 mcg 2 d/wk. No sx.     Thyroid requirements increase in pregnancy due to increases in metabolism and thyroid binding globulin. Thyroid function tests should be followed closely to ensure adequate treatment.  Hypothyroidism has been associated with higher pregnancy complication rates including miscarriage, preeclampsia, abruption, low birth weight and stillbirth but when well controlled, the risks are not increased.  Untreated hypothyroidism has also been associated with adverse fetal neurological development, but well treated hypothyroidism (i.e., euthyroid state) carries an excellent prognosis for mother and baby.      Recommendations:  Medication management:  Continue Synthroid at the current dose   Up to 1/3 of women may require increased hormone replacement in the first trimester,     Laboratory evaluation:   TSH should be monitored at least every trimester, or every 4 weeks after any medication adjustment. Adjust treatment as necessary to maintain TSH goal < 2.5 mIU/L.      Maternal pregestational diabetes classes B  through R, antepartum  She has been diabetic for >15 years. She is followed by Dr. Cid (endocrine). She has a Dexcom and a tandem pump. She reports that her last A1c was 5.6%. She is seen by endocrine weekly and reports excellent glycemic control.   The patient was counseled regarding the risks of diabetes in pregnancy. These risks include but are not limited to an increased risk of , preeclampsia/gestational hypertension, fetal structural anomalies, macrosomia, prematurity, and  complications, especially in those patients with poor glucose control. I also discussed with the patient the need for increased fetal surveillance with serial fetal growth assessments and third trimester fetal testing. I also reviewed the possibility of need for early delivery in those with poor glucose control or evidence of fetal growth abnormalities.    Recommendations (Please refer to [facility] guidelines):  Baseline evaluation (to be ordered by primary OB provider):  24 hour urine protein or urine P/C ratio, CBC, CMP  Maternal EKG if not done in the last 2 years  She reports regular eye exams  Hemoglobin A1C every trimester  Fasting and 2-hour postprandial glucose monitoring.   Goals of fasting levels below 95 mg/dL and postprandial levels below 120 mg/dL.   Continue follow-up with endocrine  Medications:   Insulin pump with Continuous Glucose Meter (CGM)  The patient has an insulin pump and CGM. She follows with Endocrinology (Primary MD/NP Dr. Cid)  Ultrasounds:  Targeted anatomy survey at 20 weeks--scheduled  Serial growth ultrasounds q 4-6 weeks at 26-28 weeks  A fetal echocardiogram is recommended at 22-24 weeks with pediatric cardiology  Prenatal testing  Twice weekly BPP/ NST + AFV starting at 32 weeks. (Should be ordered and arranged by the patient's primary OB provider).  Delivery timin 0/7 to 38 and 6/7 weeks if under good control without comorbidities  37 0/7 to 37 and 6/7 weeks if  longstanding diabetes or poorly controlled, polyhydramnios, EFW>90th percentile, or BMI >= 40  36 0/7 to 37 and 6/7 weeks if vascular complications, prior stillbirth or other complicating conditions.  Recommend consideration of earlier delivery if IUGR, HTN, or other complications  Recommend offering  for delivery is EFW is 4500g or more near the time of delivery  Insulin management during labor and delivery  Type 1 diabetics require an infusion of glucose (D5 ½ NS) and insulin to avoid ketosis and hypoglycemia; the insulin infusion can be temporarily paused if hypoglycemia is noted and additional dextrose (D10 or ½ amp of D50) can be administered  Patients who are well controlled with an insulin pump can continue during labor and delivery. Recommend primary OB ensure pump site is out of operative field and confirm pump is compatible with and able to be left on during surgery.  Postpartum management  Insulin should be reduced by 1/2 of the pre-delivery dose within the first 6-24 hours following delivery.  Patients who are breastfeeding should be advised to have small snacks before breastfeeding to reduce the risk of hypoglycemia.  Patients should be referred to primary MD or Endocrinology for postpartum management.    She was also advised that if she notes nausea/vomiting, inability to tolerate PO, or concerns about being able to take her insulin that she should contact her provider or present to the OB ED.      Subchorionic hematoma in second trimester  She has been having vaginal bleeding for the last several weeks of pregnancy.  It started out with bright red bleeding and passage of clots.  She was on progesterone as well.  She stopped the progesterone about a week ago.  She has been noted to have a subchorionic hematoma.  Now she is mainly having old blood with smaller amounts of bleeding noted.  She has not had any significant cramping with the bleeding recently.    On ultrasound today a 6.8 x 6.7 x 2.8  cm subchorionic hematoma is seen at the edge of the placenta.  There is no retroplacental clot.  The amniotic fluid volume is normal.    I counseled her regarding today's ultrasound findings.  We discussed that subchorionic hematoma can be associated with an increased risk for pregnancy complications including  delivery.  I explained that there is no particular intervention at this point except avoiding any strenuous activities.  We discussed how subchorionic hematomas develop.    I explained that we would plan to re-evaluate the clot at the time of her anatomy scan but in the interim I would not recommend any particular intervention.  Because of the bleeding I told her I would hold off at this time on low-dose aspirin.      FOLLOW UP:     F/u in 4 weeks for US/MFM visit      50 minutes of total time spent on the encounter, which includes face to face time and non-face to face time preparing to see the patient (eg, review of tests), obtaining and/or reviewing separately obtained history, documenting clinical information in the electronic or other health record, independently interpreting results (not separately reported) and communicating results to the patient/family/caregiver, or care coordination (not separately reported).      Luis Fernando Paz  Maternal-Fetal Medicine    Electronically Signed by Luis Fernando Paz 2024

## 2024-11-14 ENCOUNTER — ROUTINE PRENATAL (OUTPATIENT)
Dept: OBSTETRICS AND GYNECOLOGY | Facility: CLINIC | Age: 29
End: 2024-11-14
Payer: COMMERCIAL

## 2024-11-14 ENCOUNTER — LAB VISIT (OUTPATIENT)
Dept: LAB | Facility: HOSPITAL | Age: 29
End: 2024-11-14
Attending: INTERNAL MEDICINE
Payer: COMMERCIAL

## 2024-11-14 VITALS
BODY MASS INDEX: 29.45 KG/M2 | DIASTOLIC BLOOD PRESSURE: 70 MMHG | SYSTOLIC BLOOD PRESSURE: 110 MMHG | WEIGHT: 166.25 LBS

## 2024-11-14 DIAGNOSIS — I51.9 MYXEDEMA HEART DISEASE: ICD-10-CM

## 2024-11-14 DIAGNOSIS — R53.83 FATIGUE: ICD-10-CM

## 2024-11-14 DIAGNOSIS — E10.9 DIABETES MELLITUS TYPE I: ICD-10-CM

## 2024-11-14 DIAGNOSIS — O24.012 TYPE 1 DIABETES MELLITUS AFFECTING PREGNANCY IN SECOND TRIMESTER, ANTEPARTUM: Primary | ICD-10-CM

## 2024-11-14 DIAGNOSIS — E03.9 MYXEDEMA HEART DISEASE: ICD-10-CM

## 2024-11-14 DIAGNOSIS — E03.9 HYPOTHYROIDISM AFFECTING PREGNANCY IN SECOND TRIMESTER: ICD-10-CM

## 2024-11-14 DIAGNOSIS — Z79.899 NEED FOR PROPHYLACTIC CHEMOTHERAPY: ICD-10-CM

## 2024-11-14 DIAGNOSIS — O24.319 MATERNAL PREGESTATIONAL DIABETES CLASSES B THROUGH R, ANTEPARTUM: ICD-10-CM

## 2024-11-14 DIAGNOSIS — E55.9 AVITAMINOSIS D: ICD-10-CM

## 2024-11-14 DIAGNOSIS — O99.282 HYPOTHYROIDISM AFFECTING PREGNANCY IN SECOND TRIMESTER: ICD-10-CM

## 2024-11-14 DIAGNOSIS — Z3A.15 15 WEEKS GESTATION OF PREGNANCY: ICD-10-CM

## 2024-11-14 PROCEDURE — 84481 FREE ASSAY (FT-3): CPT | Performed by: INTERNAL MEDICINE

## 2024-11-14 PROCEDURE — 84443 ASSAY THYROID STIM HORMONE: CPT | Performed by: INTERNAL MEDICINE

## 2024-11-14 PROCEDURE — 83036 HEMOGLOBIN GLYCOSYLATED A1C: CPT | Performed by: INTERNAL MEDICINE

## 2024-11-14 PROCEDURE — 0502F SUBSEQUENT PRENATAL CARE: CPT | Mod: CPTII,S$GLB,, | Performed by: OBSTETRICS & GYNECOLOGY

## 2024-11-14 PROCEDURE — 99999 PR PBB SHADOW E&M-EST. PATIENT-LVL II: CPT | Mod: PBBFAC,,, | Performed by: OBSTETRICS & GYNECOLOGY

## 2024-11-14 PROCEDURE — 84439 ASSAY OF FREE THYROXINE: CPT | Performed by: INTERNAL MEDICINE

## 2024-11-14 RX ORDER — ASPIRIN 81 MG/1
81 TABLET ORAL DAILY
Status: CANCELLED | COMMUNITY
Start: 2024-11-14 | End: 2025-08-21

## 2024-11-14 RX ORDER — ASPIRIN 81 MG/1
81 TABLET ORAL DAILY
COMMUNITY
End: 2024-11-14

## 2024-11-14 NOTE — PROGRESS NOTES
No complaints. Bleeding is very minimal and is old blood. Messaged MFM re: possibly starting 81 mg aspirin. Labs done today with Endo.

## 2024-11-15 LAB
ESTIMATED AVG GLUCOSE: 97 MG/DL (ref 68–131)
HBA1C MFR BLD: 5 % (ref 4–5.6)
T3FREE SERPL-MCNC: 2.6 PG/ML (ref 2.3–4.2)
T4 FREE SERPL-MCNC: 1 NG/DL (ref 0.71–1.51)
TSH SERPL DL<=0.005 MIU/L-ACNC: 0.98 UIU/ML (ref 0.4–4)

## 2024-11-18 ENCOUNTER — PATIENT MESSAGE (OUTPATIENT)
Dept: OBSTETRICS AND GYNECOLOGY | Facility: CLINIC | Age: 29
End: 2024-11-18
Payer: COMMERCIAL

## 2024-11-20 ENCOUNTER — PATIENT MESSAGE (OUTPATIENT)
Dept: OTHER | Facility: OTHER | Age: 29
End: 2024-11-20
Payer: COMMERCIAL

## 2024-11-27 ENCOUNTER — PATIENT MESSAGE (OUTPATIENT)
Dept: OTHER | Facility: OTHER | Age: 29
End: 2024-11-27
Payer: COMMERCIAL

## 2024-12-15 NOTE — PROGRESS NOTES
13w2d  Recurrent vaginal bleeding this pregnancy. She reports happened in her first pregnancy as well. ASA held.   Urine dipstick today negative.  SSE: No abnormal-appearing vaginal discharge and minimal dark brown blood/mucus in vault. Cervix with os closed. Bimanual with uterus non-tender. Vaginosis swab collected.  Discussed recommendation to see MFM due to recurrences and agrees. Order placed.  Primary OB notified.  RTC as scheduled

## 2024-12-16 ENCOUNTER — PROCEDURE VISIT (OUTPATIENT)
Dept: MATERNAL FETAL MEDICINE | Facility: CLINIC | Age: 29
End: 2024-12-16
Attending: OBSTETRICS & GYNECOLOGY
Payer: COMMERCIAL

## 2024-12-16 ENCOUNTER — OFFICE VISIT (OUTPATIENT)
Dept: MATERNAL FETAL MEDICINE | Facility: CLINIC | Age: 29
End: 2024-12-16
Payer: COMMERCIAL

## 2024-12-16 VITALS
BODY MASS INDEX: 29.9 KG/M2 | SYSTOLIC BLOOD PRESSURE: 122 MMHG | DIASTOLIC BLOOD PRESSURE: 68 MMHG | HEIGHT: 63 IN | WEIGHT: 168.75 LBS

## 2024-12-16 DIAGNOSIS — E03.9 HYPOTHYROIDISM AFFECTING PREGNANCY IN SECOND TRIMESTER: ICD-10-CM

## 2024-12-16 DIAGNOSIS — O41.8X20 SUBCHORIONIC HEMATOMA IN SECOND TRIMESTER, SINGLE OR UNSPECIFIED FETUS: ICD-10-CM

## 2024-12-16 DIAGNOSIS — Z36.89 ENCOUNTER FOR FETAL ANATOMIC SURVEY: ICD-10-CM

## 2024-12-16 DIAGNOSIS — O24.319 MATERNAL PREGESTATIONAL DIABETES CLASSES B THROUGH R, ANTEPARTUM: Primary | ICD-10-CM

## 2024-12-16 DIAGNOSIS — O99.282 HYPOTHYROIDISM AFFECTING PREGNANCY IN SECOND TRIMESTER: ICD-10-CM

## 2024-12-16 DIAGNOSIS — Z36.89 ENCOUNTER FOR ULTRASOUND TO ASSESS FETAL GROWTH: ICD-10-CM

## 2024-12-16 DIAGNOSIS — O46.8X2 SUBCHORIONIC HEMATOMA IN SECOND TRIMESTER, SINGLE OR UNSPECIFIED FETUS: ICD-10-CM

## 2024-12-16 PROCEDURE — 1159F MED LIST DOCD IN RCRD: CPT | Mod: CPTII,S$GLB,, | Performed by: STUDENT IN AN ORGANIZED HEALTH CARE EDUCATION/TRAINING PROGRAM

## 2024-12-16 PROCEDURE — 99214 OFFICE O/P EST MOD 30 MIN: CPT | Mod: S$GLB,,, | Performed by: STUDENT IN AN ORGANIZED HEALTH CARE EDUCATION/TRAINING PROGRAM

## 2024-12-16 PROCEDURE — 76811 OB US DETAILED SNGL FETUS: CPT | Mod: S$GLB,,, | Performed by: STUDENT IN AN ORGANIZED HEALTH CARE EDUCATION/TRAINING PROGRAM

## 2024-12-16 PROCEDURE — 3008F BODY MASS INDEX DOCD: CPT | Mod: CPTII,S$GLB,, | Performed by: STUDENT IN AN ORGANIZED HEALTH CARE EDUCATION/TRAINING PROGRAM

## 2024-12-16 PROCEDURE — 99999 PR PBB SHADOW E&M-EST. PATIENT-LVL III: CPT | Mod: PBBFAC,,, | Performed by: STUDENT IN AN ORGANIZED HEALTH CARE EDUCATION/TRAINING PROGRAM

## 2024-12-16 PROCEDURE — 3078F DIAST BP <80 MM HG: CPT | Mod: CPTII,S$GLB,, | Performed by: STUDENT IN AN ORGANIZED HEALTH CARE EDUCATION/TRAINING PROGRAM

## 2024-12-16 PROCEDURE — 3074F SYST BP LT 130 MM HG: CPT | Mod: CPTII,S$GLB,, | Performed by: STUDENT IN AN ORGANIZED HEALTH CARE EDUCATION/TRAINING PROGRAM

## 2024-12-16 PROCEDURE — 3044F HG A1C LEVEL LT 7.0%: CPT | Mod: CPTII,S$GLB,, | Performed by: STUDENT IN AN ORGANIZED HEALTH CARE EDUCATION/TRAINING PROGRAM

## 2024-12-16 RX ORDER — ASPIRIN 81 MG/1
81 TABLET ORAL DAILY
COMMUNITY

## 2024-12-16 NOTE — ASSESSMENT & PLAN NOTE
She was diagnosed with hypothyroidism is 4/24. Managed by Dr. Cid (endocrine).   Lab Results   Component Value Date    TSH 0.981 11/14/2024     On Synthroid 25 mcg 5d/wk/50 mcg 2 d/wk.    Recommendations:  Medication management:  Continue Synthroid at the current dose   Up to 1/3 of women may require increased hormone replacement in the first trimester,     Laboratory evaluation:   TSH should be monitored at least every trimester, or every 4 weeks after any medication adjustment. Adjust treatment as necessary to maintain TSH goal < 2.5 mIU/L.

## 2024-12-16 NOTE — ASSESSMENT & PLAN NOTE
She previously had vaginal bleeding for several weeks at the beginning of her pregnancy.  It started out with bright red bleeding and passage of clots and she was noted to have a subchorionic hematoma. She stopped bleeding about one month ago and has restarted her baby aspirin.    On ultrasound today showed that the DEVAUGHN has gotten smaller, measuring [id number] at the edge of the placenta.  The amniotic fluid volume is normal. We discussed that subchorionic hematomas take time to resolve and that this may be seen on future ultrasounds as well. We discussed return precautions for additional bleeding.    She has initiated ASA for preeclampsia ppx without issue.

## 2024-12-16 NOTE — PROGRESS NOTES
"Maternal Fetal Medicine follow up consult    SUBJECTIVE:     Maryjane Land is a 29 y.o.  female with IUP at 19w5d who is seen in follow up consultation by M.  Pregnancy complications include:   Problem   Subchorionic Hematoma in Second Trimester       Previous notes reviewed.   No changes to medical, surgical, family, social, or obstetric history.    Interval history since last M visit: Denies vaginal bleeding, leakage of fluid, contractions, or DFM. She feels well today without complaints.     Medications reviewed.    Care team members:  Dr. Sampson - Primary OB       OBJECTIVE:   /68 (BP Location: Right arm, Patient Position: Sitting) Comment (BP Location): Dexacom on left arm  Ht 5' 3" (1.6 m)   Wt 76.5 kg (168 lb 12.2 oz)   LMP 2024 (Exact Date)   BMI 29.89 kg/m²     Physical Exam  No Acute distress  Normal respiratory effort  Gravid Abdomen    Ultrasound performed. See viewpoint for full ultrasound report.  Neal IUP with cardiac activity is identified.  A detailed fetal anatomic ultrasound examination was performed for the following high risk indication: T1DM.   No fetal structural malformations are identified; however, fetal imaging is incomplete today.   Fetal size today is consistent with established gestational age ( g).   Transabdominal cervical length is normal.   Placental location is posterior without evidence of previa.  Subchorionic hematoma is smaller today, measuring 43 mm x 35 mm x 15 mm, previously 64 mm x 22 mm x 66 mm.    ASSESSMENT/PLAN:     29 y.o.  female with IUP at 19w5d    Maternal pregestational diabetes classes B through R, antepartum  Please see prior notes for full counseling. She has been diabetic for >15 years. She is followed by Dr. Cid (endocrine) and has an appointment with her this afternoon. She has a Dexcom and a tandem pump. She is seen by endocrine weekly and reports excellent glycemic control.     Recommendations " (Please refer to [facility] guidelines):  Baseline evaluation (to be ordered by primary OB provider):  24 hour urine protein or urine P/C ratio, CBC, CMP  Maternal EKG if not done in the last 2 years  She reports regular eye exams  Hemoglobin A1C every trimester  Fasting and 2-hour postprandial glucose monitoring.   Goals of fasting levels below 95 mg/dL and postprandial levels below 120 mg/dL.   Continue follow-up with endocrine  Medications:   Insulin pump with Continuous Glucose Meter (CGM)  The patient has an insulin pump and CGM. She follows with Endocrinology (Primary MD/NP Dr. Cid)  Ultrasounds:  Targeted anatomy survey at 20 weeks--reviewed today  Serial growth ultrasounds q 4-6 weeks at 26-28 weeks  A fetal echocardiogram is recommended at 22-24 weeks with pediatric cardiology  Prenatal testing  Twice weekly BPP/ NST + AFV starting at 32 weeks. (Should be ordered and arranged by the patient's primary OB provider).  Delivery timin 0/7 to 38 and 6/7 weeks if under good control without comorbidities  37 0/7 to 37 and 6/7 weeks if longstanding diabetes or poorly controlled, polyhydramnios, EFW>90th percentile, or BMI >= 40  36 0/7 to 37 and 6/7 weeks if vascular complications, prior stillbirth or other complicating conditions.  Recommend consideration of earlier delivery if IUGR, HTN, or other complications  Recommend offering  for delivery is EFW is 4500g or more near the time of delivery  Insulin management during labor and delivery  Type 1 diabetics require an infusion of glucose (D5 ½ NS) and insulin to avoid ketosis and hypoglycemia; the insulin infusion can be temporarily paused if hypoglycemia is noted and additional dextrose (D10 or ½ amp of D50) can be administered  Patients who are well controlled with an insulin pump can continue during labor and delivery. Recommend primary OB ensure pump site is out of operative field and confirm pump is compatible with and able to be left on  during surgery.  Postpartum management  Insulin should be reduced by 1/2 of the pre-delivery dose within the first 6-24 hours following delivery.  Patients who are breastfeeding should be advised to have small snacks before breastfeeding to reduce the risk of hypoglycemia.  Patients should be referred to primary MD or Endocrinology for postpartum management.    She was also advised that if she notes nausea/vomiting, inability to tolerate PO, or concerns about being able to take her insulin that she should contact her provider or present to the OB ED.      Subchorionic hematoma in second trimester  She previously had vaginal bleeding for several weeks at the beginning of her pregnancy.  It started out with bright red bleeding and passage of clots and she was noted to have a subchorionic hematoma. She stopped bleeding about one month ago and has restarted her baby aspirin.    On ultrasound today showed that the DEVAUGHN has gotten smaller, measuring [id number] at the edge of the placenta.  The amniotic fluid volume is normal. We discussed that subchorionic hematomas take time to resolve and that this may be seen on future ultrasounds as well. We discussed return precautions for additional bleeding.    She has initiated ASA for preeclampsia ppx without issue.         Hypothyroidism affecting pregnancy in second trimester  She was diagnosed with hypothyroidism is 4/24. Managed by Dr. Cid (endocrine).   Lab Results   Component Value Date    TSH 0.981 11/14/2024     On Synthroid 25 mcg 5d/wk/50 mcg 2 d/wk.    Recommendations:  Medication management:  Continue Synthroid at the current dose   Up to 1/3 of women may require increased hormone replacement in the first trimester,     Laboratory evaluation:   TSH should be monitored at least every trimester, or every 4 weeks after any medication adjustment. Adjust treatment as necessary to maintain TSH goal < 2.5 mIU/L.        Vickie Dodge PGY5  Maternal Fetal Medicine  Fellow      ATTENDING ATTESTATION  I have seen the patient and reviewed the Dr. Dodge's consultation note, assessment and plan. I have personally spoken to and discussed plan with the patient and agree with the findings.      FOLLOW UP:   F/u in 4 weeks for MFM visit  F/u in 4 weeks for US    Patient was counseled that prenatal ultrasound studies have limitations. They do not detect all fetal, genetic, placental, and maternal abnormalities. A normal appearing prenatal ultrasound is reassuring. However, it does not guarantee the absence of an abnormality or predict a normal outcome for the fetus or the mother.     The patient was given an opportunity to ask questions about the management of her high risk pregnancy problems. She expressed an understanding of and agreement to the above impression and plan. All questions were answered to her satisfaction.    30 minutes of total time spent on the encounter, which includes face to face time and non-face to face time preparing to see the patient (eg, review of tests), obtaining and/or reviewing separately obtained history, documenting clinical information in the electronic or other health record, independently interpreting results (not separately reported) and communicating results to the patient/family/caregiver, or care coordination (not separately reported).      Tay Martinez MD  Maternal Fetal Medicine

## 2024-12-16 NOTE — ASSESSMENT & PLAN NOTE
Please see prior notes for full counseling. She has been diabetic for >15 years. She is followed by Dr. Cid (endocrine) and has an appointment with her this afternoon. She has a Dexcom and a tandem pump. She is seen by endocrine weekly and reports excellent glycemic control.     Recommendations (Please refer to [facility] guidelines):  Baseline evaluation (to be ordered by primary OB provider):  24 hour urine protein or urine P/C ratio, CBC, CMP  Maternal EKG if not done in the last 2 years  She reports regular eye exams  Hemoglobin A1C every trimester  Fasting and 2-hour postprandial glucose monitoring.   Goals of fasting levels below 95 mg/dL and postprandial levels below 120 mg/dL.   Continue follow-up with endocrine  Medications:   Insulin pump with Continuous Glucose Meter (CGM)  The patient has an insulin pump and CGM. She follows with Endocrinology (Primary MD/NP Dr. Cid)  Ultrasounds:  Targeted anatomy survey at 20 weeks--reviewed today  Serial growth ultrasounds q 4-6 weeks at 26-28 weeks  A fetal echocardiogram is recommended at 22-24 weeks with pediatric cardiology  Prenatal testing  Twice weekly BPP/ NST + AFV starting at 32 weeks. (Should be ordered and arranged by the patient's primary OB provider).  Delivery timin 0/7 to 38 and 6/7 weeks if under good control without comorbidities  37 0/7 to 37 and 6/7 weeks if longstanding diabetes or poorly controlled, polyhydramnios, EFW>90th percentile, or BMI >= 40  36 0/7 to 37 and 6/7 weeks if vascular complications, prior stillbirth or other complicating conditions.  Recommend consideration of earlier delivery if IUGR, HTN, or other complications  Recommend offering  for delivery is EFW is 4500g or more near the time of delivery  Insulin management during labor and delivery  Type 1 diabetics require an infusion of glucose (D5 ½ NS) and insulin to avoid ketosis and hypoglycemia; the insulin infusion can be temporarily paused if  hypoglycemia is noted and additional dextrose (D10 or ½ amp of D50) can be administered  Patients who are well controlled with an insulin pump can continue during labor and delivery. Recommend primary OB ensure pump site is out of operative field and confirm pump is compatible with and able to be left on during surgery.  Postpartum management  Insulin should be reduced by 1/2 of the pre-delivery dose within the first 6-24 hours following delivery.  Patients who are breastfeeding should be advised to have small snacks before breastfeeding to reduce the risk of hypoglycemia.  Patients should be referred to primary MD or Endocrinology for postpartum management.    She was also advised that if she notes nausea/vomiting, inability to tolerate PO, or concerns about being able to take her insulin that she should contact her provider or present to the OB ED.

## 2024-12-18 ENCOUNTER — PATIENT MESSAGE (OUTPATIENT)
Dept: OTHER | Facility: OTHER | Age: 29
End: 2024-12-18
Payer: COMMERCIAL

## 2025-01-14 ENCOUNTER — OFFICE VISIT (OUTPATIENT)
Dept: MATERNAL FETAL MEDICINE | Facility: CLINIC | Age: 30
End: 2025-01-14
Payer: COMMERCIAL

## 2025-01-14 ENCOUNTER — PATIENT MESSAGE (OUTPATIENT)
Dept: PEDIATRIC CARDIOLOGY | Facility: CLINIC | Age: 30
End: 2025-01-14
Payer: COMMERCIAL

## 2025-01-14 ENCOUNTER — PROCEDURE VISIT (OUTPATIENT)
Dept: MATERNAL FETAL MEDICINE | Facility: CLINIC | Age: 30
End: 2025-01-14
Payer: COMMERCIAL

## 2025-01-14 ENCOUNTER — TELEPHONE (OUTPATIENT)
Dept: PEDIATRIC CARDIOLOGY | Facility: CLINIC | Age: 30
End: 2025-01-14
Payer: COMMERCIAL

## 2025-01-14 VITALS
BODY MASS INDEX: 30.69 KG/M2 | WEIGHT: 173.19 LBS | DIASTOLIC BLOOD PRESSURE: 80 MMHG | HEIGHT: 63 IN | SYSTOLIC BLOOD PRESSURE: 127 MMHG

## 2025-01-14 DIAGNOSIS — O99.282 HYPOTHYROIDISM AFFECTING PREGNANCY IN SECOND TRIMESTER: ICD-10-CM

## 2025-01-14 DIAGNOSIS — O24.319 MATERNAL PREGESTATIONAL DIABETES CLASSES B THROUGH R, ANTEPARTUM: ICD-10-CM

## 2025-01-14 DIAGNOSIS — Z36.89 ENCOUNTER FOR ULTRASOUND TO ASSESS FETAL GROWTH: ICD-10-CM

## 2025-01-14 DIAGNOSIS — O41.8X20 SUBCHORIONIC HEMATOMA IN SECOND TRIMESTER, SINGLE OR UNSPECIFIED FETUS: ICD-10-CM

## 2025-01-14 DIAGNOSIS — O24.012 TYPE 1 DIABETES MELLITUS AFFECTING PREGNANCY IN SECOND TRIMESTER, ANTEPARTUM: Primary | ICD-10-CM

## 2025-01-14 DIAGNOSIS — E03.9 HYPOTHYROIDISM AFFECTING PREGNANCY IN SECOND TRIMESTER: ICD-10-CM

## 2025-01-14 DIAGNOSIS — O46.8X2 SUBCHORIONIC HEMATOMA IN SECOND TRIMESTER, SINGLE OR UNSPECIFIED FETUS: ICD-10-CM

## 2025-01-14 PROCEDURE — 99214 OFFICE O/P EST MOD 30 MIN: CPT | Mod: S$GLB,,, | Performed by: STUDENT IN AN ORGANIZED HEALTH CARE EDUCATION/TRAINING PROGRAM

## 2025-01-14 PROCEDURE — 1159F MED LIST DOCD IN RCRD: CPT | Mod: CPTII,S$GLB,, | Performed by: STUDENT IN AN ORGANIZED HEALTH CARE EDUCATION/TRAINING PROGRAM

## 2025-01-14 PROCEDURE — 3079F DIAST BP 80-89 MM HG: CPT | Mod: CPTII,S$GLB,, | Performed by: STUDENT IN AN ORGANIZED HEALTH CARE EDUCATION/TRAINING PROGRAM

## 2025-01-14 PROCEDURE — 76816 OB US FOLLOW-UP PER FETUS: CPT | Mod: S$GLB,,, | Performed by: STUDENT IN AN ORGANIZED HEALTH CARE EDUCATION/TRAINING PROGRAM

## 2025-01-14 PROCEDURE — 3074F SYST BP LT 130 MM HG: CPT | Mod: CPTII,S$GLB,, | Performed by: STUDENT IN AN ORGANIZED HEALTH CARE EDUCATION/TRAINING PROGRAM

## 2025-01-14 PROCEDURE — 3008F BODY MASS INDEX DOCD: CPT | Mod: CPTII,S$GLB,, | Performed by: STUDENT IN AN ORGANIZED HEALTH CARE EDUCATION/TRAINING PROGRAM

## 2025-01-14 PROCEDURE — 99999 PR PBB SHADOW E&M-EST. PATIENT-LVL III: CPT | Mod: PBBFAC,,, | Performed by: STUDENT IN AN ORGANIZED HEALTH CARE EDUCATION/TRAINING PROGRAM

## 2025-01-14 NOTE — TELEPHONE ENCOUNTER
Call placed to patient in an attempt to schedule her fetal echo appointment. Call was answered by voicemail recording. VM left requesting a return call to [phone] to schedule.

## 2025-01-14 NOTE — PROGRESS NOTES
"Maternal Fetal Medicine follow up consult    SUBJECTIVE:     Maryjane Land is a 29 y.o.  female with IUP at 23w6d who is seen in follow up consultation by M.  Pregnancy complications include:   Problem   Maternal Pregestational Diabetes Classes B Through R, Antepartum   Subchorionic Hematoma in Second Trimester   Hypothyroidism Affecting Pregnancy in Second Trimester       Previous notes reviewed.   No changes to medical, surgical, family, social, or obstetric history.    Interval history since last MFM visit: Doing well without obstetric complaints. Accompanied by her  today. Working closely with Endocrinology team.     Medications reviewed.    Care team members:  Marsha Sampson MD - Primary OB  Dr. Cid - Endocrinology     OBJECTIVE:   /80 (BP Location: Left arm, Patient Position: Sitting)   Ht 5' 3" (1.6 m)   Wt 78.5 kg (173 lb 2.7 oz)   LMP 2024 (Exact Date)   BMI 30.68 kg/m²     Physical Exam  WNWD female appearing stated age, in NAD  No conversational dyspnea  Gravid abdomen      Ultrasound performed. See viewpoint for full ultrasound report.  Neal IUP with cardiac activity is identified.  The fetal anatomic survey was completed today and no fetal structural abnormalities were noted of structures that were adequately visualized.   Interval fetal growth has been normal (EFW 57%, AC 71%).  MVP is normal.   Breech presentation.    Significant labs/imaging:  Lab Results   Component Value Date    TSH 0.981 2024     ASSESSMENT/PLAN:     29 y.o.  female with IUP at 23w6d    Maternal pregestational diabetes classes B through R, antepartum  Please see prior counseling.  Dx >15 years ago.   Working closely with Dr. Cid (Endocrinology) - seen weekly with good ongoing control  Dexcom and a tandem pump.     Recommendations (Please refer to [facility] guidelines):  Baseline evaluation (to be ordered by primary OB provider):  24 hour urine protein or " urine P/C ratio, CBC, CMP  Maternal EKG if not done in the last 2 years  She reports regular eye exams  Hemoglobin A1C every trimester  Fasting and 2-hour postprandial glucose monitoring.   Goals of fasting levels below 95 mg/dL and postprandial levels below 120 mg/dL.   Continue follow-up with endocrine  Medications:   Insulin pump with Continuous Glucose Meter (CGM)  The patient has an insulin pump and CGM. She follows with Endocrinology (Primary MD/NP Dr. Cid)  Ultrasounds:  Targeted anatomy survey at 20 weeks--reviewed today  Serial growth ultrasounds q 4-6 weeks at 26-28 weeks  A fetal echocardiogram is recommended at 22-24 weeks with pediatric cardiology - this was requested and scheduled today  Prenatal testing  Twice weekly BPP/ NST + AFV starting at 32 weeks. (Should be ordered and arranged by the patient's primary OB provider).  Delivery timin 0/7 to 38 and 6/7 weeks if under good control without comorbidities  37 0/7 to 37 and 6/7 weeks if longstanding diabetes or poorly controlled, polyhydramnios, EFW>90th percentile, or BMI >= 40  36 0/7 to 37 and 6/7 weeks if vascular complications, prior stillbirth or other complicating conditions.  Recommend consideration of earlier delivery if IUGR, HTN, or other complications  Recommend offering  for delivery is EFW is 4500g or more near the time of delivery  Insulin management during labor and delivery  Type 1 diabetics require an infusion of glucose (D5 ½ NS) and insulin to avoid ketosis and hypoglycemia; the insulin infusion can be temporarily paused if hypoglycemia is noted and additional dextrose (D10 or ½ amp of D50) can be administered  Patients who are well controlled with an insulin pump can continue during labor and delivery. Recommend primary OB ensure pump site is out of operative field and confirm pump is compatible with and able to be left on during surgery.  Postpartum management  Insulin should be reduced by 1/2 of the  pre-delivery dose within the first 6-24 hours following delivery.  Patients who are breastfeeding should be advised to have small snacks before breastfeeding to reduce the risk of hypoglycemia.  Patients should be referred to primary MD or Endocrinology for postpartum management.    She was also advised that if she notes nausea/vomiting, inability to tolerate PO, or concerns about being able to take her insulin that she should contact her provider or present to the OB ED.      Subchorionic hematoma in second trimester  Not seen on US imaging today and no ongoing sx. Will continue to assess on imaging as pregnancy progresses. Reassurance provided.     Hypothyroidism affecting pregnancy in second trimester  Please see prior counseling  Dx 4/24  Managed by Dr. Cid (Endocrinology)  Medication: Synthroid 25 mcg 5d/wk/50 mcg 2 d/wk.    Lab Results   Component Value Date    TSH 0.981 11/14/2024           Recommendations:  Medication management:  Continue Synthroid at the current dose     Laboratory evaluation:   TSH should be monitored at least every trimester, or every 4 weeks after any medication adjustment. Adjust treatment as necessary to maintain TSH goal < 2.5 mIU/L.      F/u in 4 weeks for MFM visit  F/u in 4 weeks for US

## 2025-01-15 ENCOUNTER — PATIENT MESSAGE (OUTPATIENT)
Dept: OTHER | Facility: OTHER | Age: 30
End: 2025-01-15
Payer: COMMERCIAL

## 2025-01-22 NOTE — ASSESSMENT & PLAN NOTE
Please see prior counseling.  Dx >15 years ago.   Working closely with Dr. Cid (Endocrinology) - seen weekly with good ongoing control  Dexcom and a tandem pump.     Recommendations (Please refer to [facility] guidelines):  Baseline evaluation (to be ordered by primary OB provider):  24 hour urine protein or urine P/C ratio, CBC, CMP  Maternal EKG if not done in the last 2 years  She reports regular eye exams  Hemoglobin A1C every trimester  Fasting and 2-hour postprandial glucose monitoring.   Goals of fasting levels below 95 mg/dL and postprandial levels below 120 mg/dL.   Continue follow-up with endocrine  Medications:   Insulin pump with Continuous Glucose Meter (CGM)  The patient has an insulin pump and CGM. She follows with Endocrinology (Primary MD/NP Dr. Cid)  Ultrasounds:  Targeted anatomy survey at 20 weeks--reviewed today  Serial growth ultrasounds q 4-6 weeks at 26-28 weeks  A fetal echocardiogram is recommended at 22-24 weeks with pediatric cardiology - this was requested and scheduled today  Prenatal testing  Twice weekly BPP/ NST + AFV starting at 32 weeks. (Should be ordered and arranged by the patient's primary OB provider).  Delivery timin 0/7 to 38 and 6/7 weeks if under good control without comorbidities  37 0/7 to 37 and 6/7 weeks if longstanding diabetes or poorly controlled, polyhydramnios, EFW>90th percentile, or BMI >= 40  36 0/7 to 37 and 6/7 weeks if vascular complications, prior stillbirth or other complicating conditions.  Recommend consideration of earlier delivery if IUGR, HTN, or other complications  Recommend offering  for delivery is EFW is 4500g or more near the time of delivery  Insulin management during labor and delivery  Type 1 diabetics require an infusion of glucose (D5 ½ NS) and insulin to avoid ketosis and hypoglycemia; the insulin infusion can be temporarily paused if hypoglycemia is noted and additional dextrose (D10 or ½ amp of D50) can be  administered  Patients who are well controlled with an insulin pump can continue during labor and delivery. Recommend primary OB ensure pump site is out of operative field and confirm pump is compatible with and able to be left on during surgery.  Postpartum management  Insulin should be reduced by 1/2 of the pre-delivery dose within the first 6-24 hours following delivery.  Patients who are breastfeeding should be advised to have small snacks before breastfeeding to reduce the risk of hypoglycemia.  Patients should be referred to primary MD or Endocrinology for postpartum management.    She was also advised that if she notes nausea/vomiting, inability to tolerate PO, or concerns about being able to take her insulin that she should contact her provider or present to the OB ED.

## 2025-01-22 NOTE — ASSESSMENT & PLAN NOTE
Please see prior counseling  Dx 4/24  Managed by Dr. Cid (Endocrinology)  Medication: Synthroid 25 mcg 5d/wk/50 mcg 2 d/wk.    Lab Results   Component Value Date    TSH 0.981 11/14/2024           Recommendations:  Medication management:  Continue Synthroid at the current dose     Laboratory evaluation:   TSH should be monitored at least every trimester, or every 4 weeks after any medication adjustment. Adjust treatment as necessary to maintain TSH goal < 2.5 mIU/L.

## 2025-01-22 NOTE — ASSESSMENT & PLAN NOTE
Not seen on US imaging today and no ongoing sx. Will continue to assess on imaging as pregnancy progresses. Reassurance provided.

## 2025-01-27 ENCOUNTER — OFFICE VISIT (OUTPATIENT)
Dept: PEDIATRIC CARDIOLOGY | Facility: CLINIC | Age: 30
End: 2025-01-27
Attending: PEDIATRICS
Payer: COMMERCIAL

## 2025-01-27 ENCOUNTER — CLINICAL SUPPORT (OUTPATIENT)
Dept: PEDIATRIC CARDIOLOGY | Facility: CLINIC | Age: 30
End: 2025-01-27
Attending: OBSTETRICS & GYNECOLOGY
Payer: COMMERCIAL

## 2025-01-27 VITALS
HEART RATE: 90 BPM | HEIGHT: 63 IN | SYSTOLIC BLOOD PRESSURE: 117 MMHG | WEIGHT: 175.69 LBS | BODY MASS INDEX: 31.13 KG/M2 | DIASTOLIC BLOOD PRESSURE: 66 MMHG

## 2025-01-27 DIAGNOSIS — Z82.79 FAMILY HISTORY OF CONGENITAL HEART DISEASE: ICD-10-CM

## 2025-01-27 DIAGNOSIS — Z3A.25 25 WEEKS GESTATION OF PREGNANCY: Primary | ICD-10-CM

## 2025-01-27 DIAGNOSIS — E03.9 HYPOTHYROIDISM AFFECTING PREGNANCY IN SECOND TRIMESTER: ICD-10-CM

## 2025-01-27 DIAGNOSIS — O24.912 DIABETES MELLITUS AFFECTING PREGNANCY IN SECOND TRIMESTER: ICD-10-CM

## 2025-01-27 DIAGNOSIS — O24.012 TYPE 1 DIABETES MELLITUS AFFECTING PREGNANCY IN SECOND TRIMESTER, ANTEPARTUM: ICD-10-CM

## 2025-01-27 DIAGNOSIS — O99.282 HYPOTHYROIDISM AFFECTING PREGNANCY IN SECOND TRIMESTER: ICD-10-CM

## 2025-01-27 DIAGNOSIS — Z36.83 ENCOUNTER FOR SCREENING FOR CONGENITAL CARDIAC ABNORMALITIES IN FETUS: ICD-10-CM

## 2025-01-27 PROCEDURE — 76827 ECHO EXAM OF FETAL HEART: CPT | Mod: S$GLB,,, | Performed by: PEDIATRICS

## 2025-01-27 PROCEDURE — 76825 ECHO EXAM OF FETAL HEART: CPT | Mod: S$GLB,,, | Performed by: PEDIATRICS

## 2025-01-27 PROCEDURE — 99999 PR PBB SHADOW E&M-EST. PATIENT-LVL III: CPT | Mod: PBBFAC,,, | Performed by: PEDIATRICS

## 2025-01-27 PROCEDURE — 3008F BODY MASS INDEX DOCD: CPT | Mod: CPTII,S$GLB,, | Performed by: PEDIATRICS

## 2025-01-27 PROCEDURE — 3078F DIAST BP <80 MM HG: CPT | Mod: CPTII,S$GLB,, | Performed by: PEDIATRICS

## 2025-01-27 PROCEDURE — 3074F SYST BP LT 130 MM HG: CPT | Mod: CPTII,S$GLB,, | Performed by: PEDIATRICS

## 2025-01-27 PROCEDURE — 1160F RVW MEDS BY RX/DR IN RCRD: CPT | Mod: CPTII,S$GLB,, | Performed by: PEDIATRICS

## 2025-01-27 PROCEDURE — 93325 DOPPLER ECHO COLOR FLOW MAPG: CPT | Mod: S$GLB,,, | Performed by: PEDIATRICS

## 2025-01-27 PROCEDURE — 99999 PR PBB SHADOW E&M-EST. PATIENT-LVL I: CPT | Mod: PBBFAC,,,

## 2025-01-27 PROCEDURE — 1159F MED LIST DOCD IN RCRD: CPT | Mod: CPTII,S$GLB,, | Performed by: PEDIATRICS

## 2025-01-27 PROCEDURE — 99204 OFFICE O/P NEW MOD 45 MIN: CPT | Mod: 25,S$GLB,, | Performed by: PEDIATRICS

## 2025-01-27 NOTE — LETTER
January 27, 2025        Marsha Sampson MD  [address]  [address]  [place] [address]             [facility] - Maternal Fetal Medicine  [address]  [facility]  [place] [address]  Phone: [phone]  Fax: [phone]   Patient: Maryjane Land   MR Number: [id number]   YOB: 1995   Date of Visit: 1/27/2025       Dear Dr. Sampson:    Thank you for referring Maryjane Land to me for evaluation. Below are the relevant portions of my assessment and plan of care.            If you have questions, please do not hesitate to call me. I look forward to following Maryjane along with you.    Sincerely,      Jean Worley MD           CC  Tay Martinez MD

## 2025-01-27 NOTE — PROGRESS NOTES
I had the pleasure of evaluating Maryjane Land who is now 29 y.o.  and carrying her 2nd pregnancy at 25 5/7 weeks gestation. She was referred for evaluation of the fetal heart due to increased risks for congenital heart disease associated with longstanding maternal insulin-dependent diabetes.  She has had excellent control of her diabetes throughout the pregnancy.      Current Outpatient Medications:     aspirin (ECOTRIN) 81 MG EC tablet, Take 81 mg by mouth once daily., Disp: , Rfl:     citalopram (CELEXA) 20 MG tablet, Take 1 tablet (20 mg total) by mouth once daily., Disp: 90 tablet, Rfl: 2    ergocalciferol, vitamin D2, (VITAMIN D ORAL), Take by mouth once daily., Disp: , Rfl:     NOVOLOG U-100 INSULIN ASPART 100 unit/mL injection, Inject into the skin., Disp: , Rfl:     prenatal vit/iron fum/folic ac (PRENATAL 1+1 ORAL), Take by mouth once daily., Disp: , Rfl:     SYNTHROID 25 mcg tablet, Take 25 mcg by mouth every morning. Takes 50mcg twice a week, Disp: , Rfl:   Review of patient's allergies indicates:   Allergen Reactions    Gum mastic Blisters, Dermatitis, Hives, Itching, Rash and Swelling    Gum ovmbfi-ylldrh-ocja-alcohol Hives and Other (See Comments)       Maternal factors increasing risk for congenital heart disease:  Previous pregnancy with early fetal loss.    Paternal factors increasing risk for congenital heart disease:  Paternal aunt had an infant  one week after delivery with a congenital heart defect described as a hole in the heart.  There are no other family members with congenital heart disease.     FETAL ECHOCARDIOGRAM (preliminary):  Study technically limited by available acoustic windows-  Normal fetal cardiac connections and function for estimated gestational age in views available.  (Full report in electronic medical record)    I reviewed the strengths and weaknesses of fetal echocardiography including the insufficient sensitivity to rule out many septal defects, anomalies  of pulmonary and systemic veins, arch anomalies, and some valvar abnormalities. I also discussed that  resolution of the ductus arteriosus and foramen ovale (normal fetal structures) cannot be assured by fetal evaluation. Finally, I reviewed today's echocardiogram that demonstrates normal anatomical connections and function for the estimated gestational age. Within the limitations imposed by fetal physiology, I would expect a reasonable probability that this infant will be born with a normal heart.      I reviewed the concerns related to maternal diabetes. With normal fetal anatomy and function at this stage of development, the primary concern is the possibility of developing hypertrophic cardiomyopathy if there is difficulty controlling the maternal glucose and insulin. I carefully reviewed the possibility of this problem in the  period, the problems that it could cause and the high probability that it would regress after the infant is no longer exposed to the maternal glucose and insulin. I also reinforced the need to regulate the insulin and glucose carefully during the remainder of the pregnancy to minimize the risk for this complication.     The father's family history for congenital heart disease is limited to a single infant born to his paternal aunt.  There are no other family members affected with congenital defects of any sort.  With this history, it is unlikely that there is a significant increase in risk for this fetus to be born with a congenital heart defect.    With the technical difficulties encountered in the fetal echocardiogram today, the maternal history of diabetes and the paternal history of a child born with congenital heart disease in a cousin, I think it would be appropriate to plan for an elective echocardiogram of the infant following delivery.    I answered all the parents's questions. I also provided an information sheet reviewing the fetal physiology and how this  compares to normal  physiology. This  also includes a reiteration of the limitations of fetal echocardiography that I have discussed today.  I also suggested that they review the information related to fetal echocardiography available at the [employer] Association website and provided directions for accessing this site. I have not scheduled another fetal evaluation; however, if questions arise later during gestation or after delivery, I would be happy to assist in any way. Ms. Land is comfortable with the plan.        RECOMMENDATIONS:  Elective echocardiographic evaluation of the infant preferably after 24 hours to allow for normal  transitional physiology to proceed and before discharge from the nursery.  Sooner if there are any signs of hemodynamic compromise.          45 minutes of total time spent on the encounter, which includes face to face time and non-face to face time preparing to see the patient (eg, review of tests), obtaining and/or reviewing separately obtained history, documenting clinical information in the electronic or other health record, independently interpreting results (not separately reported) and communicating results to the patient/family/caregiver, or care coordination (not separately reported).

## 2025-01-29 ENCOUNTER — PATIENT MESSAGE (OUTPATIENT)
Dept: OTHER | Facility: OTHER | Age: 30
End: 2025-01-29
Payer: COMMERCIAL

## 2025-02-03 ENCOUNTER — ROUTINE PRENATAL (OUTPATIENT)
Dept: OBSTETRICS AND GYNECOLOGY | Facility: CLINIC | Age: 30
End: 2025-02-03
Payer: COMMERCIAL

## 2025-02-03 ENCOUNTER — LAB VISIT (OUTPATIENT)
Dept: LAB | Facility: HOSPITAL | Age: 30
End: 2025-02-03
Attending: INTERNAL MEDICINE
Payer: COMMERCIAL

## 2025-02-03 VITALS
WEIGHT: 177.69 LBS | SYSTOLIC BLOOD PRESSURE: 136 MMHG | BODY MASS INDEX: 31.48 KG/M2 | DIASTOLIC BLOOD PRESSURE: 62 MMHG

## 2025-02-03 DIAGNOSIS — E20.9 HYPOPARATHYROIDISM: ICD-10-CM

## 2025-02-03 DIAGNOSIS — O24.012 TYPE 1 DIABETES MELLITUS AFFECTING PREGNANCY IN SECOND TRIMESTER, ANTEPARTUM: Primary | ICD-10-CM

## 2025-02-03 DIAGNOSIS — R53.83 FATIGUE: ICD-10-CM

## 2025-02-03 DIAGNOSIS — O24.319 MATERNAL PREGESTATIONAL DIABETES CLASSES B THROUGH R, ANTEPARTUM: ICD-10-CM

## 2025-02-03 DIAGNOSIS — I51.9 MYXEDEMA HEART DISEASE: ICD-10-CM

## 2025-02-03 DIAGNOSIS — Z3A.26 26 WEEKS GESTATION OF PREGNANCY: ICD-10-CM

## 2025-02-03 DIAGNOSIS — Z79.899 NEED FOR PROPHYLACTIC CHEMOTHERAPY: ICD-10-CM

## 2025-02-03 DIAGNOSIS — O99.282 HYPOTHYROIDISM AFFECTING PREGNANCY IN SECOND TRIMESTER: ICD-10-CM

## 2025-02-03 DIAGNOSIS — E55.9 AVITAMINOSIS D: ICD-10-CM

## 2025-02-03 DIAGNOSIS — E03.9 MYXEDEMA HEART DISEASE: ICD-10-CM

## 2025-02-03 DIAGNOSIS — E03.9 HYPOTHYROIDISM AFFECTING PREGNANCY IN SECOND TRIMESTER: ICD-10-CM

## 2025-02-03 LAB
ALBUMIN SERPL BCP-MCNC: 2.7 G/DL (ref 3.5–5.2)
ALP SERPL-CCNC: 88 U/L (ref 40–150)
ALT SERPL W/O P-5'-P-CCNC: 10 U/L (ref 10–44)
ANION GAP SERPL CALC-SCNC: 10 MMOL/L (ref 8–16)
AST SERPL-CCNC: 13 U/L (ref 10–40)
BASOPHILS # BLD AUTO: 0.07 K/UL (ref 0–0.2)
BASOPHILS NFR BLD: 0.4 % (ref 0–1.9)
BILIRUB SERPL-MCNC: 0.2 MG/DL (ref 0.1–1)
BUN SERPL-MCNC: 15 MG/DL (ref 6–20)
CALCIUM SERPL-MCNC: 9.1 MG/DL (ref 8.7–10.5)
CHLORIDE SERPL-SCNC: 104 MMOL/L (ref 95–110)
CO2 SERPL-SCNC: 21 MMOL/L (ref 23–29)
CREAT SERPL-MCNC: 0.7 MG/DL (ref 0.5–1.4)
DIFFERENTIAL METHOD BLD: ABNORMAL
EOSINOPHIL # BLD AUTO: 0.1 K/UL (ref 0–0.5)
EOSINOPHIL NFR BLD: 0.3 % (ref 0–8)
ERYTHROCYTE [DISTWIDTH] IN BLOOD BY AUTOMATED COUNT: 14.2 % (ref 11.5–14.5)
EST. GFR  (NO RACE VARIABLE): >60 ML/MIN/1.73 M^2
ESTIMATED AVG GLUCOSE: 114 MG/DL (ref 68–131)
GLUCOSE SERPL-MCNC: 121 MG/DL (ref 70–110)
HBA1C MFR BLD: 5.6 % (ref 4–5.6)
HCT VFR BLD AUTO: 29.9 % (ref 37–48.5)
HGB BLD-MCNC: 9 G/DL (ref 12–16)
IMM GRANULOCYTES # BLD AUTO: 0.26 K/UL (ref 0–0.04)
IMM GRANULOCYTES NFR BLD AUTO: 1.4 % (ref 0–0.5)
LYMPHOCYTES # BLD AUTO: 2 K/UL (ref 1–4.8)
LYMPHOCYTES NFR BLD: 10.5 % (ref 18–48)
MCH RBC QN AUTO: 23.5 PG (ref 27–31)
MCHC RBC AUTO-ENTMCNC: 30.1 G/DL (ref 32–36)
MCV RBC AUTO: 78 FL (ref 82–98)
MONOCYTES # BLD AUTO: 1.5 K/UL (ref 0.3–1)
MONOCYTES NFR BLD: 7.7 % (ref 4–15)
NEUTROPHILS # BLD AUTO: 14.9 K/UL (ref 1.8–7.7)
NEUTROPHILS NFR BLD: 79.7 % (ref 38–73)
NRBC BLD-RTO: 0 /100 WBC
PLATELET # BLD AUTO: 464 K/UL (ref 150–450)
PMV BLD AUTO: 11.2 FL (ref 9.2–12.9)
POTASSIUM SERPL-SCNC: 4.7 MMOL/L (ref 3.5–5.1)
PROT SERPL-MCNC: 7.1 G/DL (ref 6–8.4)
RBC # BLD AUTO: 3.83 M/UL (ref 4–5.4)
SODIUM SERPL-SCNC: 135 MMOL/L (ref 136–145)
T3FREE SERPL-MCNC: 2.8 PG/ML (ref 2.3–4.2)
T4 FREE SERPL-MCNC: 0.83 NG/DL (ref 0.71–1.51)
TSH SERPL DL<=0.005 MIU/L-ACNC: 1.49 UIU/ML (ref 0.4–4)
WBC # BLD AUTO: 18.72 K/UL (ref 3.9–12.7)

## 2025-02-03 PROCEDURE — 84439 ASSAY OF FREE THYROXINE: CPT | Performed by: INTERNAL MEDICINE

## 2025-02-03 PROCEDURE — 0502F SUBSEQUENT PRENATAL CARE: CPT | Mod: CPTII,S$GLB,, | Performed by: OBSTETRICS & GYNECOLOGY

## 2025-02-03 PROCEDURE — 80053 COMPREHEN METABOLIC PANEL: CPT | Performed by: INTERNAL MEDICINE

## 2025-02-03 PROCEDURE — 83036 HEMOGLOBIN GLYCOSYLATED A1C: CPT | Performed by: INTERNAL MEDICINE

## 2025-02-03 PROCEDURE — 84443 ASSAY THYROID STIM HORMONE: CPT | Performed by: INTERNAL MEDICINE

## 2025-02-03 PROCEDURE — 84481 FREE ASSAY (FT-3): CPT | Performed by: INTERNAL MEDICINE

## 2025-02-03 PROCEDURE — 99999 PR PBB SHADOW E&M-EST. PATIENT-LVL III: CPT | Mod: PBBFAC,,, | Performed by: OBSTETRICS & GYNECOLOGY

## 2025-02-03 PROCEDURE — 85025 COMPLETE CBC W/AUTO DIFF WBC: CPT | Performed by: OBSTETRICS & GYNECOLOGY

## 2025-02-03 PROCEDURE — 36415 COLL VENOUS BLD VENIPUNCTURE: CPT | Performed by: OBSTETRICS & GYNECOLOGY

## 2025-02-03 RX ORDER — GLUCAGON 3 MG/1
POWDER NASAL
COMMUNITY
Start: 2024-12-16

## 2025-02-05 ENCOUNTER — PATIENT MESSAGE (OUTPATIENT)
Dept: OBSTETRICS AND GYNECOLOGY | Facility: CLINIC | Age: 30
End: 2025-02-05
Payer: COMMERCIAL

## 2025-02-10 ENCOUNTER — HOSPITAL ENCOUNTER (EMERGENCY)
Facility: OTHER | Age: 30
Discharge: HOME | End: 2025-02-10
Attending: OBSTETRICS & GYNECOLOGY
Payer: COMMERCIAL

## 2025-02-10 VITALS
RESPIRATION RATE: 17 BRPM | TEMPERATURE: 98 F | OXYGEN SATURATION: 98 % | HEART RATE: 89 BPM | DIASTOLIC BLOOD PRESSURE: 60 MMHG | SYSTOLIC BLOOD PRESSURE: 109 MMHG

## 2025-02-10 DIAGNOSIS — Z3A.27 27 WEEKS GESTATION OF PREGNANCY: ICD-10-CM

## 2025-02-10 DIAGNOSIS — O36.8121 DECREASED FETAL MOVEMENTS IN SECOND TRIMESTER, FETUS 1 OF MULTIPLE GESTATION: Primary | ICD-10-CM

## 2025-02-10 DIAGNOSIS — O24.319 MATERNAL PREGESTATIONAL DIABETES CLASSES B THROUGH R, ANTEPARTUM: ICD-10-CM

## 2025-02-10 LAB
BILIRUBIN, POC UA: NEGATIVE
BLOOD, POC UA: NEGATIVE
CLARITY, UA: CLEAR
COLOR, UA: YELLOW
GLUCOSE, POC UA: NEGATIVE
KETONES, POC UA: NEGATIVE
LEUKOCYTE EST, POC UA: NEGATIVE
NITRITE, POC UA: NEGATIVE
PH UR STRIP: 7 [PH] (ref 5–8)
POCT GLUCOSE: 126 MG/DL (ref 70–110)
PROTEIN, POC UA: NEGATIVE
SPECIFIC GRAVITY, POC UA: 1.02 (ref 1–1.03)
UROBILINOGEN, POC UA: 0.2 E.U./DL

## 2025-02-10 PROCEDURE — 59025 FETAL NON-STRESS TEST: CPT

## 2025-02-10 PROCEDURE — 99285 EMERGENCY DEPT VISIT HI MDM: CPT | Mod: 25,,, | Performed by: OBSTETRICS & GYNECOLOGY

## 2025-02-10 PROCEDURE — 76815 OB US LIMITED FETUS(S): CPT | Mod: 26,,, | Performed by: OBSTETRICS & GYNECOLOGY

## 2025-02-10 PROCEDURE — 59025 FETAL NON-STRESS TEST: CPT | Mod: 26,59,, | Performed by: OBSTETRICS & GYNECOLOGY

## 2025-02-10 PROCEDURE — 82962 GLUCOSE BLOOD TEST: CPT

## 2025-02-10 PROCEDURE — 99284 EMERGENCY DEPT VISIT MOD MDM: CPT | Mod: 25

## 2025-02-10 NOTE — ED PROVIDER NOTES
Encounter Date: 2/10/2025       History     Chief Complaint   Patient presents with    Decreased Fetal Movement     Decreased fetal movement starting this morning, felt 'small movements' but not like normal, endorses some 'cramping' but no LOF, VB.     Maryjane Land is a 29 y.o. [id number] at 27w5d presents complaining of decreased fetal movement, onset this AM. Patient reports that this AM she has felt fetal movement, however it has been less than her typical movement.  This IUP is complicated by T1DM (on insulin), Hypothyroidism (on synthroid).  Patient denies contractions, denies vaginal bleeding, denies LOF.   Fetal Movement: normal       Review of patient's allergies indicates:   Allergen Reactions    Gum mastic Blisters, Dermatitis, Hives, Itching, Rash and Swelling    Gum rkbnzk-usurwd-qmxd-alcohol Hives and Other (See Comments)     Past Medical History:   Diagnosis Date    Diabetes mellitus     type 1, dx'd at age 4    H/O breast biopsy     Hypothyroidism, unspecified      Past Surgical History:   Procedure Laterality Date    DILATION AND CURETTAGE OF UTERUS USING SUCTION N/A 2024    Procedure: DILATION AND CURETTAGE, UTERUS, USING SUCTION;  Surgeon: Marsha Sampson MD;  Location: [facility] OR;  Service: OB/GYN;  Laterality: N/A;    MANDIBLE SURGERY Bilateral 2022    total joint replacements     No family history on file.  Social History     Tobacco Use    Smoking status: Never    Smokeless tobacco: Never   Substance Use Topics    Alcohol use: Not Currently    Drug use: Never     Review of Systems   Constitutional:  Negative for chills and fever.   HENT:  Negative for congestion and sore throat.    Eyes:  Negative for visual disturbance.   Respiratory:  Negative for cough and shortness of breath.    Cardiovascular:  Negative for chest pain and palpitations.   Gastrointestinal:  Negative for abdominal pain.   Genitourinary:  Negative for dysuria, hematuria, vaginal bleeding and vaginal  discharge.   Neurological:  Negative for dizziness, light-headedness and headaches.       Physical Exam     Initial Vitals [02/10/25 1107]   BP Pulse Resp Temp SpO2   125/76 94 17 97.5 °F (36.4 °C) 99 %      MAP       --         Physical Exam    Vitals reviewed.  Constitutional: She appears well-developed and well-nourished.   HENT:   Head: Normocephalic and atraumatic.   Nose: Nose normal.   Eyes: EOM are normal.   Cardiovascular:  Normal rate and intact distal pulses.           Pulmonary/Chest: No respiratory distress.   Abdominal: Abdomen is soft. There is no abdominal tenderness.   Gravid abdomen There is no rebound and no guarding.   Musculoskeletal:         General: No tenderness or edema.     Neurological: She is alert and oriented to person, place, and time.   Skin: Skin is warm and dry.   Psychiatric: She has a normal mood and affect. Her behavior is normal. Thought content normal.         ED Course   Obtain Fetal nonstress test (NST)    Date/Time: 2/10/2025 11:23 AM    Performed by: Kelsie Sanford MD  Authorized by: Kelsie Sanofrd MD    Nonstress Test:     Variability:  6-25 BPM    Decelerations:  None    Accelerations:  15 bpm    Baseline:  140    Contractions:  Not present  Biophysical Profile:     Nonstress Test Interpretation: reactive      Overall Impression:  Reassuring  Post-procedure:     Patient tolerance:  Patient tolerated the procedure well with no immediate complications    Labs Reviewed   POCT GLUCOSE - Abnormal       Result Value    POCT Glucose 126 (*)    POCT URINALYSIS W/O SCOPE    Spec Grav UA 1.020      PH, UA 7.0      Protein, UA Negative      Glucose, UA Negative      Ketones, UA Negative      Bilirubin, UA Negative      Blood, UA Negative      Leukocytes, UA Negative      Nitrite, UA Negative      Urobilinogen, UA 0.2      Color, UA POC Yellow      Clarity, UA, POC Clear            Imaging Results    None          Medications - No data to display  Medical Decision Making  Maryjane  Roopa Land is a 29 y.o. [id number] at 27w5d presents complaining of decreased fetal movement, onset this AM. Patient reports that this AM she has felt fetal movement, however it has been less than her typical movement.  This IUP is complicated by T1DM (on insulin), Hypothyroidism (on synthroid).  Patient denies contractions, denies vaginal bleeding, denies LOF.   Fetal Movement: normal    Temp:  [97.5 °F (36.4 °C)] 97.5 °F (36.4 °C)  Pulse:  [94] 94  Resp:  [17] 17  SpO2:  [99 %] 99 %  BP: (125)/(76) 125/76     NST: Reactive and reassuring  TOCO: without signs of contractions  BSUS: breech; MVP: 5cm    POCT glucose: 126    - Patient stable for discharge at this time  - ED return precautions given  - She voiced understanding and is in agreement with the plan     Kelsie Sanford MD  Obstetrics and Gynecology - PGY2               Attending Attestation:   Physician Attestation Statement for Resident:  As the supervising MD   Physician Attestation Statement: I have personally seen and examined this patient.   I agree with the above history.  -:   As the supervising MD I agree with the above PE.     As the supervising MD I agree with the above treatment, course, plan, and disposition.   I was personally present during the critical portions of the procedure(s) performed by the resident and was immediately available in the ED to provide services and assistance as needed during the entire procedure.  I have reviewed and agree with the residents interpretation of the following: lab data.  I have reviewed the following: old records at this facility.                                       Clinical Impression:  Final diagnoses:  [O36.8121] Decreased fetal movements in second trimester, fetus 1 of multiple gestation (Primary)  [Z3A.27] 27 weeks gestation of pregnancy  [O24.319] Maternal pregestational diabetes classes B through R, antepartum          ED Disposition Condition    Discharge Stable          ED Prescriptions    None        Follow-up Information       Follow up With Specialties Details Why Contact Info Additional Information    [facility] - Emergency Dept (Obstetrics) Emergency Medicine Go to  If symptoms worsen [address] [facility] [id number] [phone]     Marsha Sampson MD Gynecology, Obstetrics, Obstetrics and Gynecology On 2/20/2025  [address] [address]  [phone]       [facility] - Maternal Fetal Medicine Perinatology Go on 2/17/2025  [address] [facility] [address] [phone] Park in [facility].  Take garage elevator to the second floor.  Follow signs to the hospital and take hospital elevators to floor 3.  Check-in at Prenatal Testing registration desk.             Kelsie Sanford MD  Resident  02/10/25 7507       Gabbie Riley MD  02/12/25 1258

## 2025-02-10 NOTE — DISCHARGE INSTRUCTIONS
Call clinic ([phone]) or Labor & Delivery ([phone]) for any of the following:   - Vaginal bleeding   - Leakage of fluid  - Contractions 3-5 minutes apart for 2 hours or more  - Decreased fetal movement (10 or more movements in 2 hours, beginning at 28 weeks gestation)   - Headache unrelieved by Tylenol, pain underneath your right breast, or blurry vision  - Temperature of 100.4 or greater

## 2025-02-12 ENCOUNTER — PATIENT MESSAGE (OUTPATIENT)
Dept: OTHER | Facility: OTHER | Age: 30
End: 2025-02-12
Payer: COMMERCIAL

## 2025-02-17 ENCOUNTER — OFFICE VISIT (OUTPATIENT)
Dept: MATERNAL FETAL MEDICINE | Facility: CLINIC | Age: 30
End: 2025-02-17
Payer: COMMERCIAL

## 2025-02-17 ENCOUNTER — PROCEDURE VISIT (OUTPATIENT)
Dept: MATERNAL FETAL MEDICINE | Facility: CLINIC | Age: 30
End: 2025-02-17
Attending: OBSTETRICS & GYNECOLOGY
Payer: COMMERCIAL

## 2025-02-17 VITALS
BODY MASS INDEX: 32.02 KG/M2 | DIASTOLIC BLOOD PRESSURE: 80 MMHG | HEIGHT: 63 IN | SYSTOLIC BLOOD PRESSURE: 127 MMHG | WEIGHT: 180.69 LBS

## 2025-02-17 DIAGNOSIS — E03.9 HYPOTHYROIDISM AFFECTING PREGNANCY IN SECOND TRIMESTER: ICD-10-CM

## 2025-02-17 DIAGNOSIS — O24.319 MATERNAL PREGESTATIONAL DIABETES CLASSES B THROUGH R, ANTEPARTUM: Primary | ICD-10-CM

## 2025-02-17 DIAGNOSIS — O24.012 TYPE 1 DIABETES MELLITUS AFFECTING PREGNANCY IN SECOND TRIMESTER, ANTEPARTUM: ICD-10-CM

## 2025-02-17 DIAGNOSIS — O99.282 HYPOTHYROIDISM AFFECTING PREGNANCY IN SECOND TRIMESTER: ICD-10-CM

## 2025-02-17 DIAGNOSIS — Z36.89 ENCOUNTER FOR ULTRASOUND TO ASSESS FETAL GROWTH: ICD-10-CM

## 2025-02-17 PROCEDURE — 76816 OB US FOLLOW-UP PER FETUS: CPT | Mod: S$GLB,,, | Performed by: STUDENT IN AN ORGANIZED HEALTH CARE EDUCATION/TRAINING PROGRAM

## 2025-02-17 RX ORDER — LANOLIN ALCOHOL/MO/W.PET/CERES
1 CREAM (GRAM) TOPICAL
COMMUNITY

## 2025-02-17 NOTE — ASSESSMENT & PLAN NOTE
Please see prior counseling  Dx 4/24  Managed by Dr. Cid (Endocrinology)  Medication: Synthroid 25 mcg 5d/wk/50 mcg 2 d/wk.    Lab Results   Component Value Date    TSH 1.491 02/03/2025     Recommendations:  Medication management:  Continue Synthroid at the current dose     Laboratory evaluation:   TSH should be monitored at least every trimester, or every 4 weeks after any medication adjustment. Adjust treatment as necessary to maintain TSH goal < 2.5 mIU/L.

## 2025-02-17 NOTE — PROGRESS NOTES
"Maternal Fetal Medicine follow up consult    SUBJECTIVE:     Maryjane Land is a 29 y.o.  female with IUP at 28w5d who is seen in follow up consultation by M.  Pregnancy complications include:   Problem   Maternal Pregestational Diabetes Classes B Through R, Antepartum   Hypothyroidism Affecting Pregnancy in Second Trimester       Previous notes reviewed.   No changes to medical, surgical, family, social, or obstetric history.    Interval history since last MFM visit:   Doing well today. Continues to follow closely with Endocrinology, reporting weekly titration of pump.     Medications reviewed.    Care team members:  Dr. Sampson - Primary OB  Dr. Cid - Endocrinology     OBJECTIVE:   /80 (BP Location: Left arm, Patient Position: Sitting)   Ht 5' 2.99" (1.6 m)   Wt 81.9 kg (180 lb 10.7 oz)   LMP 2024 (Exact Date)   BMI 32.01 kg/m²       Ultrasound performed. See viewpoint for full ultrasound report.  Neal live IUP  Fetal size is appropriate for gestational age, with the EFW (1542 g) plotting at the 61% and the AC plotting at the 96%.  The AC does not plot at or beyond 3 weeks ahead of EGA; therefore, the AC percentile is less likely to be an indicator of evolving macrosomia.   A limited repeat fetal anatomic survey appears normal.   The MVP is normal.    Significant labs/imaging:  None    ASSESSMENT/PLAN:     29 y.o.  female with IUP at 28w5d    Maternal pregestational diabetes classes B through R, antepartum  Please see prior counseling.  Dx >15 years ago.   Working closely with Dr. Cid (Endocrinology) - seen weekly with good ongoing control  Dexcom and a tandem pump.   Reports struggling with some instability in BG values over the past few weeks, but this has improved with adjustments from her Endocrinologist. Reports 100% TIR over the past 24 hours.     Recommendations (Please refer to [facility] guidelines):  Baseline evaluation (to be ordered by primary OB " provider):  24 hour urine protein or urine P/C ratio, CBC, CMP - needs P/Cr  Maternal EKG if not done in the last 2 years  She reports regular eye exams  Hemoglobin A1C every trimester  Fasting and 2-hour postprandial glucose monitoring.   Goals of fasting levels below 95 mg/dL and postprandial levels below 120 mg/dL.   Continue follow-up with endocrine  Medications:   Insulin pump with Continuous Glucose Meter (CGM)  The patient has an insulin pump and CGM. She follows with Endocrinology (Primary MD/NP Dr. Cid)  Recommend pregnancy specific CGM targets ()  Ultrasounds:  Targeted anatomy completed  Serial growth ultrasounds q 4-6 weeks at 26-28 weeks  Fetal echo completed - normal   Prenatal testing  Twice weekly BPP/ NST + AFV starting at 32 weeks. (Should be ordered and arranged by the patient's primary OB provider).  Delivery timin 0/7 to 38 and 6/7 weeks if under good control without comorbidities  37 0/7 to 37 and 6/7 weeks if longstanding diabetes or poorly controlled, polyhydramnios, EFW>90th percentile, or BMI >= 40  36 0/7 to 37 and 6/7 weeks if vascular complications, prior stillbirth or other complicating conditions.  Recommend consideration of earlier delivery if IUGR, HTN, or other complications  Recommend offering  for delivery is EFW is 4500g or more near the time of delivery  Insulin management during labor and delivery  Type 1 diabetics require an infusion of glucose (D5 ½ NS) and insulin to avoid ketosis and hypoglycemia; the insulin infusion can be temporarily paused if hypoglycemia is noted and additional dextrose (D10 or ½ amp of D50) can be administered  Patients who are well controlled with an insulin pump can continue during labor and delivery. Recommend primary OB ensure pump site is out of operative field and confirm pump is compatible with and able to be left on during surgery.  Postpartum management  Insulin should be reduced by 1/2 of the pre-delivery dose within  the first 6-24 hours following delivery.  Patients who are breastfeeding should be advised to have small snacks before breastfeeding to reduce the risk of hypoglycemia.  Patients should be referred to primary MD or Endocrinology for postpartum management.    She was also advised that if she notes nausea/vomiting, inability to tolerate PO, or concerns about being able to take her insulin that she should contact her provider or present to the OB ED.      Hypothyroidism affecting pregnancy in second trimester  Please see prior counseling  Dx 4/24  Managed by Dr. Cid (Endocrinology)  Medication: Synthroid 25 mcg 5d/wk/50 mcg 2 d/wk.    Lab Results   Component Value Date    TSH 1.491 02/03/2025     Recommendations:  Medication management:  Continue Synthroid at the current dose     Laboratory evaluation:   TSH should be monitored at least every trimester, or every 4 weeks after any medication adjustment. Adjust treatment as necessary to maintain TSH goal < 2.5 mIU/L.      F/u in 4 weeks for MFM visit    Chrystal Oliva MD  PGY 7  Maternal Fetal Medicine  [facility]

## 2025-02-17 NOTE — ASSESSMENT & PLAN NOTE
Please see prior counseling.  Dx >15 years ago.   Working closely with Dr. Cid (Endocrinology) - seen weekly with good ongoing control  Dexcom and a tandem pump.   Reports struggling with some instability in BG values over the past few weeks, but this has improved with adjustments from her Endocrinologist. Reports 100% TIR over the past 24 hours.     Recommendations ([facility] to [facility] guidelines):  Baseline evaluation (to be ordered by primary OB provider):  24 hour urine protein or urine P/C ratio, CBC, CMP - needs P/Cr  Maternal EKG if not done in the last 2 years  She reports regular eye exams  Hemoglobin A1C every trimester  Fasting and 2-hour postprandial glucose monitoring.   Goals of fasting levels below 95 mg/dL and postprandial levels below 120 mg/dL.   Continue follow-up with endocrine  Medications:   Insulin pump with Continuous Glucose Meter (CGM)  The patient has an insulin pump and CGM. She follows with Endocrinology (Primary MD/NP Dr. Cid)  Recommend pregnancy specific CGM targets ()  Ultrasounds:  Targeted anatomy completed  Serial growth ultrasounds q 4-6 weeks at 26-28 weeks  Fetal echo completed - normal   Prenatal testing  Twice weekly BPP/ NST + AFV starting at 32 weeks. (Should be ordered and arranged by the patient's primary OB provider).  Delivery timin 0/7 to 38 and 6/7 weeks if under good control without comorbidities  37 0/7 to 37 and 6/7 weeks if longstanding diabetes or poorly controlled, polyhydramnios, EFW>90th percentile, or BMI >= 40  36 0/7 to 37 and 6/7 weeks if vascular complications, prior stillbirth or other complicating conditions.  Recommend consideration of earlier delivery if IUGR, HTN, or other complications  Recommend offering  for delivery is EFW is 4500g or more near the time of delivery  Insulin management during labor and delivery  Type 1 diabetics require an infusion of glucose (D5 ½ NS) and insulin to avoid ketosis and  hypoglycemia; the insulin infusion can be temporarily paused if hypoglycemia is noted and additional dextrose (D10 or ½ amp of D50) can be administered  Patients who are well controlled with an insulin pump can continue during labor and delivery. Recommend primary OB ensure pump site is out of operative field and confirm pump is compatible with and able to be left on during surgery.  Postpartum management  Insulin should be reduced by 1/2 of the pre-delivery dose within the first 6-24 hours following delivery.  Patients who are breastfeeding should be advised to have small snacks before breastfeeding to reduce the risk of hypoglycemia.  Patients should be referred to primary MD or Endocrinology for postpartum management.    She was also advised that if she notes nausea/vomiting, inability to tolerate PO, or concerns about being able to take her insulin that she should contact her provider or present to the OB ED.

## 2025-02-20 ENCOUNTER — ROUTINE PRENATAL (OUTPATIENT)
Dept: OBSTETRICS AND GYNECOLOGY | Facility: CLINIC | Age: 30
End: 2025-02-20
Payer: COMMERCIAL

## 2025-02-20 VITALS
SYSTOLIC BLOOD PRESSURE: 110 MMHG | WEIGHT: 182.31 LBS | DIASTOLIC BLOOD PRESSURE: 64 MMHG | BODY MASS INDEX: 32.31 KG/M2

## 2025-02-20 DIAGNOSIS — O24.013 TYPE 1 DIABETES MELLITUS COMPLICATING PREGNANCY, ANTEPARTUM, THIRD TRIMESTER: Primary | ICD-10-CM

## 2025-02-20 DIAGNOSIS — Z3A.29 29 WEEKS GESTATION OF PREGNANCY: ICD-10-CM

## 2025-02-20 DIAGNOSIS — E03.9 HYPOTHYROIDISM AFFECTING PREGNANCY IN SECOND TRIMESTER: ICD-10-CM

## 2025-02-20 DIAGNOSIS — Z23 NEED FOR TDAP VACCINATION: ICD-10-CM

## 2025-02-20 DIAGNOSIS — O24.319 MATERNAL PREGESTATIONAL DIABETES CLASSES B THROUGH R, ANTEPARTUM: ICD-10-CM

## 2025-02-20 DIAGNOSIS — O99.282 HYPOTHYROIDISM AFFECTING PREGNANCY IN SECOND TRIMESTER: ICD-10-CM

## 2025-02-26 ENCOUNTER — PATIENT MESSAGE (OUTPATIENT)
Dept: OTHER | Facility: OTHER | Age: 30
End: 2025-02-26
Payer: COMMERCIAL

## 2025-02-27 ENCOUNTER — HOSPITAL ENCOUNTER (EMERGENCY)
Facility: OTHER | Age: 30
Discharge: HOME | End: 2025-02-27
Attending: OBSTETRICS & GYNECOLOGY
Payer: COMMERCIAL

## 2025-02-27 ENCOUNTER — TELEPHONE (OUTPATIENT)
Dept: OBSTETRICS AND GYNECOLOGY | Facility: CLINIC | Age: 30
End: 2025-02-27
Payer: COMMERCIAL

## 2025-02-27 VITALS
HEART RATE: 105 BPM | RESPIRATION RATE: 18 BRPM | OXYGEN SATURATION: 96 % | DIASTOLIC BLOOD PRESSURE: 78 MMHG | SYSTOLIC BLOOD PRESSURE: 135 MMHG | TEMPERATURE: 98 F

## 2025-02-27 DIAGNOSIS — O36.8130 DECREASED FETAL MOVEMENTS IN THIRD TRIMESTER, SINGLE OR UNSPECIFIED FETUS: Primary | ICD-10-CM

## 2025-02-27 DIAGNOSIS — Z3A.30 30 WEEKS GESTATION OF PREGNANCY: ICD-10-CM

## 2025-02-27 LAB
BILIRUBIN, POC UA: NEGATIVE
BLOOD, POC UA: NEGATIVE
CLARITY, UA: CLEAR
COLOR, UA: YELLOW
GLUCOSE, POC UA: NEGATIVE
KETONES, POC UA: NEGATIVE
LEUKOCYTE EST, POC UA: ABNORMAL
NITRITE, POC UA: NEGATIVE
PH UR STRIP: 6.5 [PH] (ref 5–8)
PROTEIN, POC UA: NEGATIVE
SPECIFIC GRAVITY, POC UA: 1.01 (ref 1–1.03)
UROBILINOGEN, POC UA: 0.2 E.U./DL

## 2025-02-27 PROCEDURE — 99284 EMERGENCY DEPT VISIT MOD MDM: CPT | Mod: 25

## 2025-02-27 PROCEDURE — 59025 FETAL NON-STRESS TEST: CPT

## 2025-02-27 NOTE — TELEPHONE ENCOUNTER
30 1/7 week OB states she has been having very consistent strong kicks for the past several weeks.  Since yesterday the movement is drastically lighter and fainter but still getting the appropriate amount of kick counts.  Pt is very anxious.  Reassured her that its likely fine and baby probably just changed positions so not feeling movement the same as before but since its a drastic change, recommended going to OB ED since no availability in office.  Pt agreeable.

## 2025-02-27 NOTE — DISCHARGE INSTRUCTIONS
Keep previously scheduled Clinic appointment.     Return or call if you have any of the following symptoms: blurred vision, headache, vaginal bleeding, nausea/ vomiting, leaking of fluid, contractions that are 4-5 in one hour (before 36 weeks), decreased fetal movements ( 10 kicks in 2 hours), headache not relieved by Tylenol, or temp of 100.4 and greater.  Begin doing fetal kick counts, (at least 10 movements in 2 hours) starting at 28 weeks gestation. Term Labor: We want your contractions 5 min apart for 2 hours.    Any questions or concerns call Clinic or  desk for assistance.     For any questions or concerns call your Clinic [phone]  or [facility] Labor & Delivery [phone].

## 2025-02-27 NOTE — ED PROVIDER NOTES
Encounter Date: 2025       History     Chief Complaint   Patient presents with    Decreased Fetal Movement     Feels like baby's kicks are not as strong today as they were yesterday     Maryjane Land is a 29 y.o.  at 30w1d who presents to the OB ED today (2025) with CC of decreased fetal movement. Pt reports she began to feel weak kicks beginning last night and continuing this morning. Pt reports that kick counts were in the normal range. Pt denies fever, chills, or PreE symptoms.       She reports no vaginal bleeding, no leakage of fluid, and no contractions.   She reports good, but weak fetal movement.  This pregnancy is complicated by hypothyroidism, T1DM, obesity, and subchorionic hemorrhage.            Review of patient's allergies indicates:   Allergen Reactions    Gum mastic Blisters, Dermatitis, Hives, Itching, Rash and Swelling    Gum enghth-cilztl-korj-alcohol Hives and Other (See Comments)     Past Medical History:   Diagnosis Date    Diabetes mellitus     type 1, dx'd at age 4    H/O breast biopsy     Hypothyroidism, unspecified      Past Surgical History:   Procedure Laterality Date    DILATION AND CURETTAGE OF UTERUS USING SUCTION N/A 2024    Procedure: DILATION AND CURETTAGE, UTERUS, USING SUCTION;  Surgeon: Marsha Sampson MD;  [facility];  Service: OB/GYN;  Laterality: N/A;    MANDIBLE SURGERY Bilateral 2022    total joint replacements     No family history on file.  Social History[1]  Review of Systems   Constitutional:  Negative for chills and fever.   HENT:  Negative for sore throat.    Respiratory:  Negative for shortness of breath.    Cardiovascular:  Negative for chest pain.   Gastrointestinal:  Negative for nausea.   Genitourinary:  Negative for dysuria.   Musculoskeletal:  Negative for back pain.   Skin:  Negative for rash.   Neurological:  Negative for weakness.   Hematological:  Does not bruise/bleed easily.       Physical Exam      Initial Vitals [25 1143]   BP Pulse Resp Temp SpO2   128/71 94 18 98 °F (36.7 °C) 99 %      MAP       --         Physical Exam    Cardiovascular:  Normal rate, regular rhythm and normal heart sounds.           Pulmonary/Chest: Breath sounds normal.   Abdominal: Abdomen is soft. She exhibits no distension. There is no abdominal tenderness. There is no rebound and no guarding.     Neurological: She is alert and oriented to person, place, and time.         ED Course   Obtain Fetal nonstress test (NST)    Date/Time: 2025 11:51 AM    Performed by: Carey Pearson MD  Authorized by: Carey Pearson MD    Nonstress Test:     Variability:  6-25 BPM    Decelerations:  None    Accelerations:  15 bpm    Baseline:  135  Biophysical Profile:     Overall Impression:  Reassuring    Labs Reviewed   POCT URINALYSIS W/O SCOPE - Abnormal       Result Value    Spec Grav UA 1.015      PH, UA 6.5      Protein, UA Negative      Glucose, UA Negative      Ketones, UA Negative      Bilirubin, UA Negative      Blood, UA Negative      Leukocytes, UA Small (*)     Nitrite, UA Negative      Urobilinogen, UA 0.2      Color, UA POC Yellow      Clarity, UA, POC Clear            Imaging Results    None          Medications - No data to display  Medical Decision Making  Maryjane Land is a 29 y.o.  at 30w1d who presents to the OB ED today (2025) with CC of decreased fetal movement.     Temp:  [98 °F (36.7 °C)] 98 °F (36.7 °C)  Pulse:  [] 105  Resp:  [18] 18  SpO2:  [96 %-99 %] 96 %  BP: (115-135)/(61-78) 135/78    DFM  -pt reports to [facility] with complaints of DFM since last night. Now endorses ample movement in [facility]   -VSS  -NST reassuring, as above   -fetal kick counts reviewed at bedside     Pt deemed stable for discharge with strict return to ED precautions discussed at bedside    Indy Pearson MD (Mary)   Obstetrics and Gynecology, PGY1                Attending Attestation:   Physician Attestation  Statement for Resident:  As the supervising MD   Physician Attestation Statement: I have personally seen and examined this patient.   I agree with the above history.  -:   As the supervising MD I agree with the above PE.     As the supervising MD I agree with the above treatment, course, plan, and disposition.   -:     See ED course.  Agree with discharge to home.    Marta Zazueta MD,AYLA  Date and Time: 2025 4:32 PM  OB Hospitalist    I was personally present during the critical portions of the procedure(s) performed by the resident and was immediately available in the ED to provide services and assistance as needed during the entire procedure.   I have reviewed the following: old records at this facility.                ED Course as of 25 1633   Thu 2025   1218 Patient is a 29 y.o.  at 30 1/7 weeks presents with decreased FM.  She reports having the right number of movements but not as forceful.  NST + accels, no decels, no contractions.  Agree with discharge to home. [CD]      ED Course User Index  [CD] Marta Zazueta MD                           Clinical Impression:  Final diagnoses:  [Z3A.30] 30 weeks gestation of pregnancy  [O36.8130] Decreased fetal movements in third trimester, single or unspecified fetus (Primary)          ED Disposition Condition    Discharge Stable          ED Prescriptions    None       Follow-up Information    None            Carey Pearson MD  Resident  25 1427         [1]   Social History  Tobacco Use    Smoking status: Never    Smokeless tobacco: Never   Substance Use Topics    Alcohol use: Not Currently    Drug use: Never        Marta Zazueta MD  25 [id number]

## (undated) DEVICE — SEE L#120831

## (undated) DEVICE — DRAPE UND BUTT W/POUCH 40X44IN

## (undated) DEVICE — DRAPE THREE-QTR REINF 53X77IN

## (undated) DEVICE — SOL POVIDONE SCRUB IODINE 4 OZ

## (undated) DEVICE — TRAY DRY SKIN SCRUB PREP

## (undated) DEVICE — VACURETTE 9MM CURVED

## (undated) DEVICE — SCRUB 10% POVIDONE IODINE 4OZ

## (undated) DEVICE — Device